# Patient Record
Sex: FEMALE | Race: WHITE | Employment: FULL TIME | URBAN - METROPOLITAN AREA
[De-identification: names, ages, dates, MRNs, and addresses within clinical notes are randomized per-mention and may not be internally consistent; named-entity substitution may affect disease eponyms.]

---

## 2018-01-18 ENCOUNTER — HOSPITAL ENCOUNTER (OUTPATIENT)
Dept: LAB | Age: 28
Discharge: HOME | End: 2018-01-18

## 2018-01-18 ENCOUNTER — CLINICAL SUPPORT (OUTPATIENT)
Dept: FAMILY MEDICINE CLINIC | Age: 28
End: 2018-01-18

## 2018-01-18 DIAGNOSIS — E66.9 OBESITY (BMI 30-39.9): Primary | ICD-10-CM

## 2018-01-18 PROCEDURE — 99001 SPECIMEN HANDLING PT-LAB: CPT | Performed by: NURSE PRACTITIONER

## 2018-01-18 NOTE — PROGRESS NOTES
Patient attended a Medically Supervised Weight Loss New Patient Orientation today where we discussed:  - New Direction Very Low Calorie Diet details  - Medical Supervision  - Nutrition education  - Cost  - Policies and compliance required for program enrollment. Patients initial consultation with physician is tentatively scheduled for:  Future Appointments  Date Time Provider Galo Johnston   2/1/2018 8:15 AM Ruperto Lee NP [address] starting weight was documented as: There were no vitals taken for this visit.     The following patient answers were pulled from Weight Loss Questionnaire regarding weight related illness: (refer to media section for full weight loss history)  Hypertension (high blood pressure)  no   High cholesterol no   Hypothyroidism no   Obstructive sleep apnea no   Gout no   Arthritis no ;    Heart Attack in the last 3 months: no     Type I Diabetes no   Type II Diabetes   no

## 2018-01-19 LAB
1,25(OH)2D3 SERPL-MCNC: 54.7 PG/ML (ref 19.9–79.3)
ALBUMIN SERPL-MCNC: 4.3 G/DL (ref 3.5–5.5)
ALBUMIN/GLOB SERPL: 1.7 {RATIO} (ref 1.2–2.2)
ALP SERPL-CCNC: 73 IU/L (ref 39–117)
ALT SERPL-CCNC: 20 IU/L (ref 0–32)
APPEARANCE UR: CLEAR
AST SERPL-CCNC: 17 IU/L (ref 0–40)
BACTERIA #/AREA URNS HPF: ABNORMAL /[HPF]
BASOPHILS # BLD AUTO: 0 X10E3/UL (ref 0–0.2)
BASOPHILS NFR BLD AUTO: 0 %
BILIRUB SERPL-MCNC: 0.2 MG/DL (ref 0–1.2)
BILIRUB UR QL STRIP: NEGATIVE
BUN SERPL-MCNC: 11 MG/DL (ref 6–20)
BUN/CREAT SERPL: 19 (ref 9–23)
CALCIUM SERPL-MCNC: 9.2 MG/DL (ref 8.7–10.2)
CASTS URNS QL MICRO: ABNORMAL /LPF
CHLORIDE SERPL-SCNC: 103 MMOL/L (ref 96–106)
CHOLEST SERPL-MCNC: 161 MG/DL (ref 100–199)
CO2 SERPL-SCNC: 24 MMOL/L (ref 18–29)
COLOR UR: YELLOW
CREAT SERPL-MCNC: 0.59 MG/DL (ref 0.57–1)
EOSINOPHIL # BLD AUTO: 0.2 X10E3/UL (ref 0–0.4)
EOSINOPHIL NFR BLD AUTO: 2 %
EPI CELLS #/AREA URNS HPF: >10 /HPF
ERYTHROCYTE [DISTWIDTH] IN BLOOD BY AUTOMATED COUNT: 13.7 % (ref 12.3–15.4)
EST. AVERAGE GLUCOSE BLD GHB EST-MCNC: 128 MG/DL
GLOBULIN SER CALC-MCNC: 2.5 G/DL (ref 1.5–4.5)
GLUCOSE SERPL-MCNC: 75 MG/DL (ref 65–99)
GLUCOSE UR QL: NEGATIVE
HBA1C MFR BLD: 6.1 % (ref 4.8–5.6)
HCT VFR BLD AUTO: 39.4 % (ref 34–46.6)
HDLC SERPL-MCNC: 56 MG/DL
HGB BLD-MCNC: 12.7 G/DL (ref 11.1–15.9)
HGB UR QL STRIP: NEGATIVE
IMM GRANULOCYTES # BLD: 0 X10E3/UL (ref 0–0.1)
IMM GRANULOCYTES NFR BLD: 0 %
KETONES UR QL STRIP: NEGATIVE
LDLC SERPL CALC-MCNC: 85 MG/DL (ref 0–99)
LEUKOCYTE ESTERASE UR QL STRIP: NEGATIVE
LYMPHOCYTES # BLD AUTO: 2.7 X10E3/UL (ref 0.7–3.1)
LYMPHOCYTES NFR BLD AUTO: 23 %
MAGNESIUM SERPL-MCNC: 2 MG/DL (ref 1.6–2.3)
MCH RBC QN AUTO: 28.2 PG (ref 26.6–33)
MCHC RBC AUTO-ENTMCNC: 32.2 G/DL (ref 31.5–35.7)
MCV RBC AUTO: 87 FL (ref 79–97)
MICRO URNS: NORMAL
MICRO URNS: NORMAL
MONOCYTES # BLD AUTO: 0.8 X10E3/UL (ref 0.1–0.9)
MONOCYTES NFR BLD AUTO: 7 %
MUCOUS THREADS URNS QL MICRO: PRESENT
NEUTROPHILS # BLD AUTO: 8.1 X10E3/UL (ref 1.4–7)
NEUTROPHILS NFR BLD AUTO: 68 %
NITRITE UR QL STRIP: NEGATIVE
PH UR STRIP: 7 [PH] (ref 5–7.5)
PLATELET # BLD AUTO: 266 X10E3/UL (ref 150–379)
POTASSIUM SERPL-SCNC: 4 MMOL/L (ref 3.5–5.2)
PROT SERPL-MCNC: 6.8 G/DL (ref 6–8.5)
PROT UR QL STRIP: NEGATIVE
RBC # BLD AUTO: 4.51 X10E6/UL (ref 3.77–5.28)
RBC #/AREA URNS HPF: ABNORMAL /HPF
SODIUM SERPL-SCNC: 142 MMOL/L (ref 134–144)
SP GR UR: 1.02 (ref 1–1.03)
TRIGL SERPL-MCNC: 98 MG/DL (ref 0–149)
TSH SERPL DL<=0.005 MIU/L-ACNC: 2.19 UIU/ML (ref 0.45–4.5)
URATE SERPL-MCNC: 4.6 MG/DL (ref 2.5–7.1)
UROBILINOGEN UR STRIP-MCNC: 0.2 MG/DL (ref 0.2–1)
VLDLC SERPL CALC-MCNC: 20 MG/DL (ref 5–40)
WBC # BLD AUTO: 11.9 X10E3/UL (ref 3.4–10.8)
WBC #/AREA URNS HPF: ABNORMAL /HPF

## 2018-02-01 ENCOUNTER — OFFICE VISIT (OUTPATIENT)
Dept: INTERNAL MEDICINE CLINIC | Age: 28
End: 2018-02-01

## 2018-02-01 VITALS
HEIGHT: 67 IN | HEART RATE: 88 BPM | SYSTOLIC BLOOD PRESSURE: 147 MMHG | RESPIRATION RATE: 12 BRPM | OXYGEN SATURATION: 99 % | TEMPERATURE: 99.4 F | DIASTOLIC BLOOD PRESSURE: 99 MMHG | BODY MASS INDEX: 43.13 KG/M2 | WEIGHT: 274.8 LBS

## 2018-02-01 DIAGNOSIS — R03.0 ELEVATED BLOOD PRESSURE READING: ICD-10-CM

## 2018-02-01 DIAGNOSIS — E66.01 MORBID OBESITY WITH BMI OF 40.0-44.9, ADULT (HCC): Primary | ICD-10-CM

## 2018-02-01 DIAGNOSIS — R73.01 IMPAIRED FASTING GLUCOSE: ICD-10-CM

## 2018-02-01 RX ORDER — VALACYCLOVIR HYDROCHLORIDE 500 MG/1
TABLET, FILM COATED ORAL
Refills: 10 | COMMUNITY
Start: 2018-01-09

## 2018-02-01 RX ORDER — MEDROXYPROGESTERONE ACETATE 10 MG/1
TABLET ORAL
Refills: 0 | COMMUNITY
Start: 2017-11-27 | End: 2018-02-01 | Stop reason: ALTCHOICE

## 2018-02-01 RX ORDER — NORGESTREL AND ETHINYL ESTRADIOL 0.3-0.03MG
KIT ORAL
Refills: 11 | COMMUNITY
Start: 2018-01-10

## 2018-02-01 NOTE — MR AVS SNAPSHOT
[facility] 
 
 
 [address] [place], [facility] [address] [place] [address] 
[phone] Patient: Jerzy Aden MRN: [id number] GCB:[birth year] Visit Information Date & Time Provider Department Dept. Phone Encounter #  
 2/1/2018  8:15 AM Selina Chamorro NP [facility] [phone] [id number] Follow-up Instructions Return in about 4 weeks (around 3/1/2018). Your Appointments 3/1/2018  9:27 AM  
METABOLIC PROGRAM 15 with Selina Chamorro NP [facility] ([facility]) Appt Note: [facility]  
 [facility] [address]  
  
   
 [address] [facility] Up[facility] Health Maintenance Date Due DTaP/Tdap/Td series (1 - Tdap) 7/12/2011 PAP AKA CERVICAL CYTOLOGY 7/12/2011 Influenza Age 5 to Adult 8/1/2017 Allergies as of 2/1/2018  Review Complete On: 2/1/2018 By: Selina Chamorro NP No Known Allergies Current Immunizations  Never Reviewed No immunizations on file. Not reviewed this visit You Were Diagnosed With   
  
 Codes Comments Morbid obesity with BMI of 40.0-44.9, adult ([facility] [address]    -  Primary ICD-10-CM: E66.01, Z68.41 
ICD-9-CM: 278.01, V85.41 Class 3 obesity due to excess calories with serious comorbidity and body mass index (BMI) of 40.0 to 44.9 in adult [facility])     ICD-10-CM: E66.09, Z68.41 
ICD-9-CM: 278.00, V85.41 Elevated blood pressure reading     ICD-10-CM: R03.0 ICD-9-CM: 796.2 Impaired fasting glucose     ICD-10-CM: R73.01 
ICD-9-CM: 790.21 Vitals BP Pulse Temp Resp Height(growth percentile) Weight(growth percentile) (!) 147/99 (BP 1 Location: Left arm, BP Patient Position: Sitting) 88 99.4 °F (37.4 °C) (Oral) 12 5' 7\" (1.702 m) 274 lb 12.8 oz (124.6 kg) LMP SpO2 BMI OB Status Smoking Status 01/07/2018 99% 43.04 kg/m2 Having regular periods Never Smoker BMI and BSA Data Body Mass Index Body Surface Area 43.04 kg/m 2 2.43 m 2 Your Updated Medication List  
  
   
This list is accurate as of: 2/1/18  9:19 AM.  Always use your most recent med list.  
  
  
  
  
 LOW-OGESTREL (28) 0.3-30 mg-mcg Tab Generic drug:  norgestrel-ethinyl estradiol TK 1 T PO ONCE D  
  
 valACYclovir 500 mg tablet Commonly known as:  VALTREX TK 1 T PO  ONCE DAILY We Performed the Following AMB POC EKG ROUTINE W/ 12 LEADS, INTER & REP [12984 CPT(R)] Follow-up Instructions Return in about 4 weeks (around 3/1/2018). To-Do List   
 02/01/2018 Lab:  METABOLIC PANEL, COMPREHENSIVE   
  
 02/01/2018 Lab:  URIC ACID Patient Instructions Health Maintenance Due Topic Date Due  
 DTaP/Tdap/Td series (1 - Tdap) 07/12/2011  PAP AKA CERVICAL CYTOLOGY  07/12/2011  Influenza Age 5 to Adult  08/01/2017 Monthly goal: 
 
10-15 lbs weight loss 4 meal replacements a day. No other food. First one within about 1 hour of waking up to get metabolism started. Don't go more than 6 hours between meals. No more than 1 soup a day- too much salt No more than 1 bar a day- not enough protein.  
  
10 carbs extra a day. Compliance 
  
We do not expect perfection. However, we do ask for your persistence and your willingness to do the work of growing yourself.  
  
You will hit plateaus in your weight loss. You will run into situations that test your will power. Rebeka Joyce will encounter times when you feel frustrated. It's OK if you slip up from time to time. However, how your respond to your slip ups and, the adjustments you make to prevent future slip ups will determine you long-term success. 
  
If you find yourself temporarily slipping in the program, it's OK. We will gently nudge you forward and encourage you to do the work of identifying and moving beyond your stuck areas.  However, if you find yourself wavering in the program for a prolonged time (more than 3 or 4 months) we will ask that you take a break from the program. At that time you will 3 options:  
  
1. Work with our weight loss specialist LES English to explore additional weight loss options.  
  
2. Work with a counselor to do some focused work in order to identify and break the patterns that are holding you back.  
  
3. The combination of both these. 
  
Once you, your counselor and/or Ami feel that you have moved past those patterns and want to give the program another chance, we will gladly work with you to determine if you're ready to start back in the program. 
  
When returning after a break, if it's been less than 3 months, you do not need to repeat an orientation, labs or an EKG. If it's over been 3 months you will required to repeat an orientation and, if greater than 6 months, you will be required to repeat an orientation, labs and an EKG.   
  
  
Additional Tips 
  
- Consume your 4 daily meal replacements equally spaced over the day No more than 6 hours between each meal 
Breakfast is especially important 
  
- Get the support of family and friends 
  
- Snack-proof your house 
  
- Have strategies for social situations, meetings that run over or vacation 
  
  
- When you fall off the plan it's no big deal; just start right back. Turn those days into examples of what to change or avoid next time.  
  
  
 
Homework for [employer] 
 
Exercise Exercise daily  
- Start slow, gradually increase your time by around 10 to 20 % a week - To prevent injury, take a recovery week every 4 weeks (reduce your exercise time and intensity by 1/2 during this time) - Your goal is to work up to 150 min a week; hard enough that you can't whistle or sing. It may take 6 months to work up to this. - Call the Health  at [facility] labs for exercise ideas ([phone]) Diet Common Side Effects -Constipation 
-Fatigue 
-Hunger 
-Low sodium 
-Hair Loss 1. Constipation  
     -around 1 out of 5 chance it happens at all 
     -around 1 out of 10 chance you'll need to take something (see below) It can be prevented by Drinking at least 8 glasses of water a day If you're prone to constipation: - Take a Stool Softener every day:  (eg - Dulcolax Stool Softener 100 mg or Miralax) - Eat Plenty of Fiber Get the New Direction products with fiber added Keep your fiber intake to at least 20 grams a day Use SUGAR-FREE products: Fiber One products, Benefiber or Metamucil If you get constipated: 1. Start with a Stool Softener (produces a bowel movement in 72 hr): 
 Examples: 
  Miralax 1 capful twice a day (It's OK to go up to 3 caps for a few days) Dulcolax Stool Softener 100 mg 
 
2. If you still have constipation after 2 or 3 days, add a Stimulant Laxative to the Stool Softener (this will produce a bowel movement in 6 - 12 hr): 
 Examples: 
  Exlax (1 small square) for up to 4 days Dulcolax stimulant laxative Magnesium citrate pill 500 mg start with once a day and go up to 3 times a day if needed 3. Or you can go straight to a combination Stool Softner / Stimulant at night. If you need, you can add a morning dose. Examples: 
  Pericolace 50 mg / 8.25 mg Sennakot-S  50 mg / 8.25 mg 
 
4. If You're Really locked up, get Liquid Magnesium Citrate (take according to the      directions) 2. Fatigue 
     -Everyone goes through this stage More common in the first 2 weeks It's your body adjusting to the Ketogenic diet and it's normal  
 
 
3. Hunger More common in the first few days After your body adjusts to the Ketogenic diet it will go away 4. Low blood levels of sodium 
     -Not very common, especially if you're not taking a fluid pil If you develop a headache, feel fatigued, light-headed or dizzy Add 1/2 cup of bouillon broth every day 5. Hair loss -This is fairly uncommon; you may see more than a usual amount of hair in your brush or the shower drain This can happen with physical stress (surgery, trauma or calorie restriction) or psychological stress. Although is it very concerning, it is often temporary and will resolve within 3 months. Some people notice a benefit from taking a daily dose of Biotin 2,500 mcg and increasing their Fish Oil intake. Other Tips and Helpful Information - Get the following books (all found on [employer]) Change Anything by Kvng Cruz, Tabitha Rodriguez, and Jonathan Villagran Mindless Eating by Keely Hartman Perfectly Yourself by Vinny Hicks Me, Myself and I - 28 Days to Self-Love by Edy Calhoun - Consume your 4 daily meal replacements equally spaced over the    day No more than 6 hours between each meal 
 Breakfast is especially important - Get the support of family and friends 
 
- Snack-proof your house - Have strategies for social situations, meetings that run over or vacation - When you fall off the plan it's no big deal; just start right back; turn those days into examples of what to avoid next time. Long-term Healthy Weight / Body Fat Different ways to determining your ideal weight: 
1. Keep your waist to less than 1/2 of your height 2. Keep your % body fat to under 30% for female and under 20% if male 3. Keep your BMI around 25 Supplements 1. Vitacost Synergy Basic Multi-Vitamin (Their In-House Brand) Take 1 capsule twice a day Found ONLY at [facility], NOT at [facility], [facility], the Vitamin Shoppe, etc 
    SKU #: [id number]  
    $16.49   / 1 month supply 
    www. [employer]  [phone]  
 
 
2. N-Acetyl Cysteine (NAC) -- 600 mg 
     1 pill once a day Found at many stores but [address] has a good price: 
     Item #: [id number] [id number]  $9.99 / 120 pills (4 month supply) 3. Turmeric with Black Pepper Extract (or Bioperine) 500 mg 
    1 pill 1-2 times a day (more is joint pain or increased inflammation) Found at many stores but [address] has a good price: 
    PRAKASH #: [id number]  $13.64 / 60 pills 4. Fish Oil Take 1 capsule a day [employer] Brand: \"Omega 3 Fish Oil (per pill:  )\" OR 
 
Take 1 Tbsp 3 days a week of any of the following: 
 
V[employer] Shoppe Brand: Lemon or Orange flavored Fish Oil Nutra Sea + D:  Apple flavored Nutra Sea:  [place] flavored (These are found at most Vitamin Stores or on [employer]) FYI:  
Typical fish oil per pill =  mg and  mg 
Krill oil per pill = EPA 50 - 70 mg and DHA 27 - 250 mg 
 
 
^^^^^^^^^^^^^^^^^^^^^^^^^^^^^^^^^^^^^^^^^^^^^^^^^^^^^^^^^^^^^^^^^^^^^^^^^^^^^^^^^^^^^^^^^^^^^^^ Carbohydrates If you do not metabolize carbohydrates well, you will lose weight and keep the weigh off by keeping your carbohydrate intake low. How do you know if you do not metabolize carbs well? If your Triglycerides, sdLCL-C and Insulin Resistance are elevated, then you will do well to follow a low carb diet. Fun Facts About Carbs - The Typical American Diet = 450 grams a day - The Reducing Phase of the VLCD = 40 grams a day - Target for weight loss maintenance: 
 - Eat no more than 30 grams per meal 
 - Eat no more than 10 grams per snack (mid-morning and mid-afternoon snack) Resources for finding out where carbs hide in our foods: 
1. Our Registered Dietitians 2. Www. [employer]. com/tools 3. Read food labels 4. Books - The Calorie Maganrashad Tobin - The New Atkins Diet for a New You 
     - Bela Moise's NEW Carb and Calorie 4th Edition (my favorite) 5. Smart phone and Internet-based apps - [employer]  
     - Carb Manager If you want to get a better picture of your cardiac risk, consider getting a coronary calcium score:  Call [phone] to schedule one. Introducing [facility]! [address] introduces Portable Scores patient portal. Now you can access parts of your medical record, email your doctor's office, and request medication refills online. 1. In your internet browser, go to [email or web address] 2. Click on the First Time User? Click Here link in the Sign In box. You will see the New Member Sign Up page. 3. Enter your Portable Scores Access Code exactly as it appears below. You will not need to use this code after youve completed the sign-up process. If you do not sign up before the expiration date, you must request a new code. · Portable Scores Access Code: [email or web address] Expires: 5/2/2018  8:44 AM 
 
4. Enter the last four digits of your Social Security Number (xxxx) and Date of Birth (mm/dd/yyyy) as indicated and click Submit. You will be taken to the next sign-up page. 5. Create a Portable Scores ID. This will be your Portable Scores login ID and cannot be changed, so think of one that is secure and easy to remember. 6. Create a Portable Scores password. You can change your password at any time. 7. Enter your Password Reset Question and Answer. This can be used at a later time if you forget your password. 8. Enter your e-mail address. You will receive e-mail notification when new information is available in [address] 9. Click Sign Up. You can now view and download portions of your medical record. 10. Click the Download Summary menu link to download a portable copy of your medical information. If you have questions, please visit the Frequently Asked Questions section of the Portable Scores website. Remember, Portable Scores is NOT to be used for urgent needs. For medical emergencies, dial 911. Now available from your iPhone and Android! Please provide this summary of care documentation to your next provider. Your primary care clinician is listed as VERN RAMIREZ.  If you have any questions after today's visit, please call [phone].

## 2018-02-01 NOTE — PATIENT INSTRUCTIONS
Health Maintenance Due   Topic Date Due    DTaP/Tdap/Td series (1 - Tdap) 07/12/2011    PAP AKA CERVICAL CYTOLOGY  07/12/2011    Influenza Age 9 to Adult  08/01/2017     Monthly goal:    10-15 lbs weight loss      4 meal replacements a day. No other food. First one within about 1 hour of waking up to get metabolism started. Don't go more than 6 hours between meals. No more than 1 soup a day- too much salt  No more than 1 bar a day- not enough protein.      10 carbs extra a day. Compliance     We do not expect perfection. However, we do ask for your persistence and your willingness to do the work of growing yourself.      You will hit plateaus in your weight loss. You will run into situations that test your will power. Gillian Gambino will encounter times when you feel frustrated. It's OK if you slip up from time to time. However, how your respond to your slip ups and, the adjustments you make to prevent future slip ups will determine you long-term success.     If you find yourself temporarily slipping in the program, it's OK. We will gently nudge you forward and encourage you to do the work of identifying and moving beyond your stuck areas. However, if you find yourself wavering in the program for a prolonged time (more than 3 or 4 months) we will ask that you take a break from the program. At that time you will 3 options:      1. Work with our weight loss specialist LES English to explore additional weight loss options.      2. Work with a counselor to do some focused work in order to identify and break the patterns that are holding you back.      3.  The combination of both these.     Once you, your counselor and/or Ami feel that you have moved past those patterns and want to give the program another chance, we will gladly work with you to determine if you're ready to start back in the program.     When returning after a break, if it's been less than 3 months, you do not need to repeat an orientation, labs or an EKG. If it's over been 3 months you will required to repeat an orientation and, if greater than 6 months, you will be required to repeat an orientation, labs and an EKG.          Additional Tips     - Consume your 4 daily meal replacements equally spaced over the day  No more than 6 hours between each meal  Breakfast is especially important     - Get the support of family and friends     - Snack-proof your house     - Have strategies for social situations, meetings that run over or vacation        - When you fall off the plan it's no big deal; just start right back. Turn those days into examples of what to change or avoid next time.           Homework for [employer]    Exercise    Exercise daily   - Start slow, gradually increase your time by around 10 to 20 % a week    - To prevent injury, take a recovery week every 4 weeks (reduce your exercise time and intensity by 1/2 during this time)    - Your goal is to work up to 150 min a week; hard enough that you can't whistle or sing. It may take 6 months to work up to this. - Call the Health  at [facility] labs for exercise ideas ([phone])          Diet          Common Side Effects    -Constipation  -Fatigue  -Hunger  -Low sodium  -Hair Loss      1. Constipation        -around 1 out of 5 chance it happens at all       -around 1 out of 10 chance you'll need to take something (see below)    It can be prevented by Drinking at least 8 glasses of water a day    If you're prone to constipation:  - Take a Stool Softener every day:  (eg - Dulcolax Stool Softener 100 mg or Miralax)  - Eat Plenty of Fiber   Get the New Direction products with fiber added   Keep your fiber intake to at least 20 grams a day   Use SUGAR-FREE products: Fiber One products, Benefiber or Metamucil      If you get constipated:  1.  Start with a Stool Softener (produces a bowel movement in 72 hr):   Examples:    Miralax 1 capful twice a day (It's OK to go up to 3 caps for a few days)    Dulcolax Stool Softener 100 mg    2. If you still have constipation after 2 or 3 days, add a Stimulant Laxative to the Stool Softener (this will produce a bowel movement in 6 - 12 hr):   Examples:    Exlax (1 small square) for up to 4 days    Dulcolax stimulant laxative    Magnesium citrate pill 500 mg start with once a day and go up to 3 times a day if needed    3. Or you can go straight to a combination Stool Softner / Stimulant at night. If you need, you can add a morning dose. Examples:    Pericolace 50 mg / 8.25 mg    Sennakot-S  50 mg / 8.25 mg    4. If You're Really locked up, get Liquid Magnesium Citrate (take according to the      directions)      2. Fatigue       -Everyone goes through this stage  More common in the first 2 weeks  It's your body adjusting to the Ketogenic diet and it's normal       3. Hunger  More common in the first few days  After your body adjusts to the Ketogenic diet it will go away       4. Low blood levels of sodium       -Not very common, especially if you're not taking a fluid pil  If you develop a headache, feel fatigued, light-headed or dizzy  Add 1/2 cup of bouillon broth every day      5. Hair loss       -This is fairly uncommon; you may see more than a usual amount of hair in your brush or the shower drain  This can happen with physical stress (surgery, trauma or calorie restriction) or psychological stress. Although is it very concerning, it is often temporary and will resolve within 3 months. Some people notice a benefit from taking a daily dose of Biotin 2,500 mcg and increasing their Fish Oil intake.       Other Tips and Helpful Information    - Get the following books (all found on [address])    Change Anything by Waldo Pichardo, Princess Cox, and Geraldyne Nissen    Mindless Eating by Erin Kohli    Perfectly Yourself by Tania Robes    Me, Myself and I - 28 Days to Self-Love by Shanice Scott your 4 daily meal replacements equally spaced over the    day   No more than 6 hours between each meal   Breakfast is especially important    - Get the support of family and friends    - Snack-proof your house    - Have strategies for social situations, meetings that run over or vacation      - When you fall off the plan it's no big deal; just start right back; turn those days into examples of what to avoid next time. Long-term Healthy Weight / Body Fat  Different ways to determining your ideal weight:  1. Keep your waist to less than 1/2 of your height   2. Keep your % body fat to under 30% for female and under 20% if male  3. Keep your BMI around 25        Supplements      1. Vitacost Synergy Basic Multi-Vitamin (Their In-House Brand)      Take 1 capsule twice a day        Found ONLY at [facility], NOT at [facility], [place], [employer], the Vitamin Shoppe, etc      SKU #: [id number]       $16.49   / 1 month supply      www. [employer]  [phone]       2. N-Acetyl Cysteine (NAC) -- 600 mg       1 pill once a day       Found at many stores but [employer] has a good price:       Item #: [id number]  $9.99 / 120 pills (4 month supply)      3. Turmeric with Black Pepper Extract (or Bioperine) 500 mg      1 pill 1-2 times a day (more is joint pain or increased inflammation)      Found at many stores but [employer] has a good price:      SKU #: [id number]  $13.64 / 60 pills        4.  Fish Oil      Take 1 capsule a day      Vitamin Shoppe Brand: \"Omega 3 Fish Oil (per pill:  )\"                                                               OR    Take 1 Tbsp 3 days a week of any of the following:    Vitamin Shoppe Brand: Lemon or Orange flavored Fish Oil   Nutra Sea + D:  Apple flavored   Nutra Sea:  [place] flavored   (These are found at most Vitamin Stores or on [employer])    FYI:   Typical fish oil per pill =  mg and  mg  Krill oil per pill = EPA 50 - 70 mg and DHA 27 - 250 mg      ^^^^^^^^^^^^^^^^^^^^^^^^^^^^^^^^^^^^^^^^^^^^^^^^^^^^^^^^^^^^^^^^^^^^^^^^^^^^^^^^^^^^^^^^^^^^^^^      Carbohydrates     If you do not metabolize carbohydrates well, you will lose weight and keep the weigh off by keeping your carbohydrate intake low. How do you know if you do not metabolize carbs well? If your Triglycerides, sdLCL-C and Insulin Resistance are elevated, then you will do well to follow a low carb diet. Fun Facts About Carbs    - The Typical American Diet = 450 grams a day    - The Reducing Phase of the VLCD = 40 grams a day    - Target for weight loss maintenance:   - Eat no more than 30 grams per meal   - Eat no more than 10 grams per snack (mid-morning and mid-afternoon snack)        Resources for finding out where carbs hide in our foods:  1. Our Registered Dietitians    2. Www. Atkins. com/tools    3. Read food labels    4. Books       - The Calorie Threasa Simmer       - The [place] System Diet for a New You       - Bela Moise's NEW Carb and Calorie 4th Edition (my favorite)    5. Smart phone and Internet-based apps       - POPSUGARFitnessPal        - Carb Manager      If you want to get a better picture of your cardiac risk, consider getting a coronary calcium score:  Call [phone] to schedule one.

## 2018-02-01 NOTE — PROGRESS NOTES
VLCD Progress Note: Initial Physician Visit    Ghulam Saravia is a 32 y.o. female who is here for medical screening for the VLCD Program.      Weight History  Current weight 274.8 lb Body mass index is 43.04 kg/(m^2). Goal weight 150 lb  Highest weight 274.8lb, at 32years old    Weight loss History  How many weight loss attempts have you had? 5  Which program were you most successful at? [facility]    Significant Medical History  MI w/ in the last 3 months: no  Type I Diabetes: no  Type II Diabetes: impaired fasting glucose, A1C on initial testing 6.1  Liver or Kidney disease requiring protein restriction: no  Pregnant or planning on becoming pregnant w/in 6 months: no  Recent treatment for Cancer: no  History of Uric-acid Kidney Stone or elevated Uric Acid: no  Peptic ulcer disease not well controlled: no  Recent onset of Inflammatory Bowel Disease: no    If female:  Patient's last menstrual period was 2018.     Significant Psychosocial History  Major lifestyle changes: no   Other commitments: no   Any potential unsupportive: no     Current psychotherapy: no  Ever hospitalized for psychiatric reasons: no  History of depression: no  History of suicidal ideation: no  Dramatic mood changes during dieting: no  History of binge eating: yes  If yes, is there a history of purging: no    Social History  Social History   Substance Use Topics    Smoking status: Never Smoker    Smokeless tobacco: Never Used    Alcohol use Yes      Comment: social        Has Robert Emerson ever been told by a physician not to exercise: no    Does Robert Emerson know of any reason they shouldn't exercise: no  If yes, why?       Does Robert Emerson have any food allergies or sensitivities: no    Allergies include: No Known Allergies    PHQ over the last two weeks 2018   Little interest or pleasure in doing things Not at all   Feeling down, depressed or hopeless Several days   Total Score PHQ 2 1         Objective    Visit Vitals    BP (!) 147/99 (BP 1 Location: Left arm, BP Patient Position: Sitting)    Pulse 88    Temp 99.4 °F (37.4 °C) (Oral)    Resp 12    Ht 5' 7\" (1.702 m)    Wt 274 lb 12.8 oz (124.6 kg)    LMP 01/07/2018    SpO2 99%    BMI 43.04 kg/m2     Appearance: Obese, A&O x 3, NAD  HEENT:  NC/AT, PERRL  Neck:  No nodes, no JVD  Heart:  RRR without M/R/G  Lungs:  CTAB, no rhonchi, rales, or wheezes with good air exchange   Abdomen:  Non-tender, pos bowel sounds, no hepatosplenomegally  Ext:  No C/C/E      Labs    [facility] labs reviewed extensively with patient. Will continue with monthly CMP, uric acid checks    CBC:   Lab Results   Component Value Date/Time    WBC 11.9 01/18/2018 12:00 AM    RBC 4.51 01/18/2018 12:00 AM    HGB 12.7 01/18/2018 12:00 AM    HCT 39.4 01/18/2018 12:00 AM    PLATELET 187 30/21/2158 12:00 AM     CMP:   Lab Results   Component Value Date/Time    Glucose 75 01/18/2018 12:00 AM    Sodium 142 01/18/2018 12:00 AM    Potassium 4.0 01/18/2018 12:00 AM    Chloride 103 01/18/2018 12:00 AM    CO2 24 01/18/2018 12:00 AM    BUN 11 01/18/2018 12:00 AM    Creatinine 0.59 01/18/2018 12:00 AM    Calcium 9.2 01/18/2018 12:00 AM    BUN/Creatinine ratio 19 01/18/2018 12:00 AM    Alk. phosphatase 73 01/18/2018 12:00 AM    Protein, total 6.8 01/18/2018 12:00 AM    Albumin 4.3 01/18/2018 12:00 AM    A-G Ratio 1.7 01/18/2018 12:00 AM      Lab Results   Component Value Date/Time    Hemoglobin A1c 6.1 01/18/2018 12:00 AM    Glucose 75 01/18/2018 12:00 AM    LDL, calculated 85 01/18/2018 12:00 AM    Creatinine 0.59 01/18/2018 12:00 AM      Lab Results   Component Value Date/Time    Cholesterol, total 161 01/18/2018 12:00 AM    HDL Cholesterol 56 01/18/2018 12:00 AM    LDL, calculated 85 01/18/2018 12:00 AM    Triglyceride 98 01/18/2018 12:00 AM     Lab Results   Component Value Date/Time    TSH 2.190 01/18/2018 12:00 AM          Assessment/Plan    ICD-10-CM ICD-9-CM    1.  Morbid obesity with BMI of 40.0-44.9, adult ([facility]) E66.01 278.01 AMB POC EKG ROUTINE W/ 12 LEADS, INTER & REP    F78.23 C84.15 METABOLIC PANEL, COMPREHENSIVE      URIC ACID   2. Class 3 obesity due to excess calories with serious comorbidity and body mass index (BMI) of 40.0 to 44.9 in adult ([facility]) E66.09 278.00     Z68.41 V85.41    3. Elevated blood pressure reading R03.0 796.2    4. Impaired fasting glucose R73.01 790.21        Diet regimen   # of meal replacements prescribed: 4   If modified LCD-nutritional guidelines:    Monthly Goal   10-15 lbs weight loss    Medical monitoring schedule:   Weekly BP/Weight checks   Monthly provider appointments      I have reviewed/discussed the above normal BMI with the patient. I have recommended the following interventions: dietary management education, guidance, and counseling and prescribed dietary intake . .      Ms. Joey Garcia has a reminder for a \"due or due soon\" health maintenance. I have asked that she contact her primary care provider for follow-up on this health maintenance.

## 2018-02-01 NOTE — PROGRESS NOTES
Chief Complaint   Patient presents with    Weight Management     Consult     1. Have you been to the ER, urgent care clinic since your last visit? Hospitalized since your last visit? No    2. Have you seen or consulted any other health care providers outside of the [address] [place] [address] since your last visit? Include any pap smears or colon screening.  No

## 2018-02-07 ENCOUNTER — CLINICAL SUPPORT (OUTPATIENT)
Dept: FAMILY MEDICINE CLINIC | Age: 28
End: 2018-02-07

## 2018-02-07 DIAGNOSIS — E66.9 OBESITY, UNSPECIFIED CLASSIFICATION, UNSPECIFIED OBESITY TYPE, UNSPECIFIED WHETHER SERIOUS COMORBIDITY PRESENT: Primary | ICD-10-CM

## 2018-02-12 VITALS — WEIGHT: 271.4 LBS | BODY MASS INDEX: 42.51 KG/M2

## 2018-02-12 NOTE — PROGRESS NOTES
Wt Readings from Last 3 Encounters:   02/12/18 271 lb 6.4 oz (123.1 kg)   02/01/18 274 lb 12.8 oz (124.6 kg)     No vitals taken. Patient attended weekly education class facilitated by Registered Dietitian.

## 2018-02-14 ENCOUNTER — CLINICAL SUPPORT (OUTPATIENT)
Dept: FAMILY MEDICINE CLINIC | Age: 28
End: 2018-02-14

## 2018-02-15 VITALS
SYSTOLIC BLOOD PRESSURE: 139 MMHG | DIASTOLIC BLOOD PRESSURE: 70 MMHG | HEIGHT: 67 IN | BODY MASS INDEX: 41.56 KG/M2 | WEIGHT: 264.8 LBS | HEART RATE: 75 BPM

## 2018-02-15 NOTE — PROGRESS NOTES
Progress Note: Weekly Education Class in the [facility] Weight Loss Program   Is there anything that you or the patient needs to let the [facility] Physician know about? no  Over the past week, have you experienced any side-effects? no    Ghulam Saravia is a 32 y.o. female who is enrolled in [facility] Loss Program    Visit Vitals    /70 (BP 1 Location: Right arm, BP Patient Position: Sitting)    Pulse 75    Ht 5' 7\" (1.702 m)    Wt 264 lb 12.8 oz (120.1 kg)    BMI 41.47 kg/m2     Weight Metrics 2/15/2018 2/14/2018 2/7/2018 2/1/2018 2/1/2018   Weight - 264 lb 12.8 oz 271 lb 6.4 oz - 274 lb 12.8 oz   Waist Measure Inches 50 - - 50 -   Exercise Mins/week - - - 0 -   Body Fat % - - - 44.6 -   BMI - 41.47 kg/m2 42.51 kg/m2 - 43.04 kg/m2         Have you received any other medical care this week? no  If yes, where and for what? Have you had any change in your medications since your last visit? no  If yes what? Did you have any problems adhering to the program last week? no  If yes, please explain:       Eating Habits Over Last Week:  Did you take in 64 oz of non-caloric fluids? yes     Did you consume your 4 meal replacements each day?  yes       Physical Activity Over the Past Week:    Aerobic exercise: 90 min  Resistance exercise: 0 workouts / week

## 2018-02-21 ENCOUNTER — CLINICAL SUPPORT (OUTPATIENT)
Dept: FAMILY MEDICINE CLINIC | Age: 28
End: 2018-02-21

## 2018-02-22 ENCOUNTER — HOSPITAL ENCOUNTER (OUTPATIENT)
Dept: LAB | Age: 28
Discharge: HOME | End: 2018-02-22

## 2018-02-22 VITALS
HEIGHT: 67 IN | BODY MASS INDEX: 41 KG/M2 | SYSTOLIC BLOOD PRESSURE: 128 MMHG | WEIGHT: 261.2 LBS | HEART RATE: 67 BPM | DIASTOLIC BLOOD PRESSURE: 91 MMHG

## 2018-02-22 PROCEDURE — 99001 SPECIMEN HANDLING PT-LAB: CPT | Performed by: NURSE PRACTITIONER

## 2018-02-22 NOTE — PROGRESS NOTES
Progress Note: Weekly Education Class in the [facility] Weight Loss Program   Is there anything that you or the patient needs to let the [facility] Physician know about? no  Over the past week, have you experienced any side-effects? no    Arik Hogue is a 32 y.o. female who is enrolled in [facility] Weight Loss Program    Visit Vitals    BP (!) 128/91 (BP 1 Location: Right arm, BP Patient Position: Sitting)    Pulse 67    Ht 5' 7\" (1.702 m)    Wt 261 lb 3.2 oz (118.5 kg)    BMI 40.91 kg/m2     Weight Metrics 2/22/2018 2/21/2018 2/15/2018 2/14/2018 2/7/2018 2/1/2018 2/1/2018   Weight - 261 lb 3.2 oz - 264 lb 12.8 oz 271 lb 6.4 oz - 274 lb 12.8 oz   Waist Measure Inches 48 - 50 - - 50 -   Exercise Mins/week - - - - - 0 -   Body Fat % - - - - - 44.6 -   BMI - 40.91 kg/m2 - 41.47 kg/m2 42.51 kg/m2 - 43.04 kg/m2         Have you received any other medical care this week? no  If yes, where and for what? Have you had any change in your medications since your last visit? no  If yes what? Did you have any problems adhering to the program last week? no  If yes, please explain:       Eating Habits Over Last Week:  Did you take in 64 oz of non-caloric fluids? yes     Did you consume your 4 meal replacements each day?  yes       Physical Activity Over the Past Week:    Aerobic exercise: 60 min  Resistance exercise: 0 workouts / week

## 2018-02-23 LAB
ALBUMIN SERPL-MCNC: 4.1 G/DL (ref 3.5–5.5)
ALBUMIN/GLOB SERPL: 1.6 {RATIO} (ref 1.2–2.2)
ALP SERPL-CCNC: 73 IU/L (ref 39–117)
ALT SERPL-CCNC: 118 IU/L (ref 0–32)
AST SERPL-CCNC: 52 IU/L (ref 0–40)
BILIRUB SERPL-MCNC: 0.3 MG/DL (ref 0–1.2)
BUN SERPL-MCNC: 10 MG/DL (ref 6–20)
BUN/CREAT SERPL: 14 (ref 9–23)
CALCIUM SERPL-MCNC: 9.6 MG/DL (ref 8.7–10.2)
CHLORIDE SERPL-SCNC: 103 MMOL/L (ref 96–106)
CO2 SERPL-SCNC: 23 MMOL/L (ref 18–29)
CREAT SERPL-MCNC: 0.72 MG/DL (ref 0.57–1)
GFR SERPLBLD CREATININE-BSD FMLA CKD-EPI: 115 ML/MIN/{1.73_M2}
GFR SERPLBLD CREATININE-BSD FMLA CKD-EPI: 133 ML/MIN/{1.73_M2}
GLOBULIN SER CALC-MCNC: 2.5 G/L (ref 1.5–4.5)
GLUCOSE SERPL-MCNC: 77 MG/DL (ref 65–99)
POTASSIUM SERPL-SCNC: 5.3 MMOL/L (ref 3.5–5.2)
PROT SERPL-MCNC: 6.6 G/DL (ref 6–8.5)
SODIUM SERPL-SCNC: 143 MMOL/L (ref 134–144)
URATE SERPL-MCNC: 5.2 MG/DL (ref 2.5–7.1)

## 2018-03-01 ENCOUNTER — OFFICE VISIT (OUTPATIENT)
Dept: INTERNAL MEDICINE CLINIC | Age: 28
End: 2018-03-01

## 2018-03-01 VITALS
WEIGHT: 257.6 LBS | DIASTOLIC BLOOD PRESSURE: 74 MMHG | BODY MASS INDEX: 40.43 KG/M2 | OXYGEN SATURATION: 97 % | HEIGHT: 67 IN | TEMPERATURE: 98.1 F | HEART RATE: 69 BPM | SYSTOLIC BLOOD PRESSURE: 131 MMHG | RESPIRATION RATE: 14 BRPM

## 2018-03-01 DIAGNOSIS — R73.01 IMPAIRED FASTING GLUCOSE: ICD-10-CM

## 2018-03-01 DIAGNOSIS — E66.01 OBESITY, MORBID, BMI 40.0-49.9 (HCC): Primary | ICD-10-CM

## 2018-03-01 NOTE — PATIENT INSTRUCTIONS
Health Maintenance Due   Topic Date Due    DTaP/Tdap/Td series (1 - Tdap) 07/12/2011    PAP AKA CERVICAL CYTOLOGY  07/12/2011    Influenza Age 5 to Adult  08/01/2017     Monthly goal:    10-15 lbs weight loss         Body Mass Index: Care Instructions  Your Care Instructions    Body mass index (BMI) can help you see if your weight is raising your risk for health problems. It uses a formula to compare how much you weigh with how tall you are. · A BMI lower than 18.5 is considered underweight. · A BMI between 18.5 and 24.9 is considered healthy. · A BMI between 25 and 29.9 is considered overweight. A BMI of 30 or higher is considered obese. If your BMI is in the normal range, it means that you have a lower risk for weight-related health problems. If your BMI is in the overweight or obese range, you may be at increased risk for weight-related health problems, such as high blood pressure, heart disease, stroke, arthritis or joint pain, and diabetes. If your BMI is in the underweight range, you may be at increased risk for health problems such as fatigue, lower protection (immunity) against illness, muscle loss, bone loss, hair loss, and hormone problems. BMI is just one measure of your risk for weight-related health problems. You may be at higher risk for health problems if you are not active, you eat an unhealthy diet, or you drink too much alcohol or use tobacco products. Follow-up care is a key part of your treatment and safety. Be sure to make and go to all appointments, and call your doctor if you are having problems. It's also a good idea to know your test results and keep a list of the medicines you take. How can you care for yourself at home? · Practice healthy eating habits. This includes eating plenty of fruits, vegetables, whole grains, lean protein, and low-fat dairy. · If your doctor recommends it, get more exercise. Walking is a good choice. Bit by bit, increase the amount you walk every day. Try for at least 30 minutes on most days of the week. · Do not smoke. Smoking can increase your risk for health problems. If you need help quitting, talk to your doctor about stop-smoking programs and medicines. These can increase your chances of quitting for good. · Limit alcohol to 2 drinks a day for men and 1 drink a day for women. Too much alcohol can cause health problems. If you have a BMI higher than 25  · Your doctor may do other tests to check your risk for weight-related health problems. This may include measuring the distance around your waist. A waist measurement of more than 40 inches in men or 35 inches in women can increase the risk of weight-related health problems. · Talk with your doctor about steps you can take to stay healthy or improve your health. You may need to make lifestyle changes to lose weight and stay healthy, such as changing your diet and getting regular exercise. If you have a BMI lower than 18.5  · Your doctor may do other tests to check your risk for health problems. · Talk with your doctor about steps you can take to stay healthy or improve your health. You may need to make lifestyle changes to gain or maintain weight and stay healthy, such as getting more healthy foods in your diet and doing exercises to build muscle. Where can you learn more? Go to http://aby-laura.info/. Enter S176 in the search box to learn more about \"Body Mass Index: Care Instructions. \"  Current as of: October 13, 2016  Content Version: 11.4  © 4223-6621 Healthwise, Incorporated. Care instructions adapted under license by [employer] (which disclaims liability or warranty for this information). If you have questions about a medical condition or this instruction, always ask your healthcare professional. [address] any warranty or liability for your use of this information.

## 2018-03-01 NOTE — MR AVS SNAPSHOT
Antonette Boothe 
 
 
 [address] [facility], [address] [facility] 
[phone] Patient: Camelia Persaud MRN: [id number] HEM:[id number] Visit Information Date & Time Provider Department Dept. Phone Encounter #  
 3/1/2018  8:15 AM Mark Beckford NP [facility] [phone] [id number] Follow-up Instructions Return in about 4 weeks (around 3/29/2018). Your Appointments 4/4/2018  9:15 AM  
METABOLIC PROGRAM 15 with Mark Beckford NP [facility] ([address]) Appt Note: 1 month follow up  
 [address] [facility], [address] [place] [address] [place]  
  
   
 [address], [address] Upcoming Health Maintenance Date Due DTaP/Tdap/Td series (1 - Tdap) 7/12/2011 PAP AKA CERVICAL CYTOLOGY 7/12/2011 Influenza Age 5 to Adult 8/1/2017 Allergies as of 3/1/2018  Review Complete On: 3/1/2018 By: Mark Beckford NP No Known Allergies Current Immunizations  Never Reviewed No immunizations on file. Not reviewed this visit You Were Diagnosed With   
  
 Codes Comments Obesity, morbid, BMI 40.0-49.9 [facility]    -  Primary ICD-10-CM: E66.01 
ICD-9-CM: 278.01 Impaired fasting glucose     ICD-10-CM: R73.01 
ICD-9-CM: 790.21 Vitals BP Pulse Temp Resp Height(growth percentile) Weight(growth percentile) 131/74 (BP 1 Location: Left arm, BP Patient Position: Sitting) 69 98.1 °F (36.7 °C) (Oral) 14 5' 7\" (1.702 m) 257 lb 9.6 oz (116.8 kg) LMP SpO2 BMI OB Status Smoking Status 02/12/2018 97% 40.35 kg/m2 Having regular periods Never Smoker BMI and BSA Data Body Mass Index Body Surface Area  
 40.35 kg/m 2 2.35 m 2 Your Updated Medication List  
  
   
This list is accurate as of 3/1/18  8:23 AM.  Always use your most recent med list.  
  
  
  
  
 LOW-OGESTREL (28) 0.3-30 mg-mcg Tab Generic drug:  norgestrel-ethinyl estradiol TK 1 T PO ONCE D  
  
 valACYclovir 500 mg tablet Commonly known as:  VALTREX TK 1 T PO  ONCE DAILY Follow-up Instructions Return in about 4 weeks (around 3/29/2018). To-Do List   
 03/01/2018 Lab:  METABOLIC PANEL, COMPREHENSIVE   
  
 03/01/2018 Lab:  URIC ACID Patient Instructions Health Maintenance Due Topic Date Due  
 DTaP/Tdap/Td series (1 - Tdap) 07/12/2011  PAP AKA CERVICAL CYTOLOGY  07/12/2011  Influenza Age 5 to Adult  08/01/2017 Monthly goal: 
 
10-15 lbs weight loss Body Mass Index: Care Instructions Your Care Instructions Body mass index (BMI) can help you see if your weight is raising your risk for health problems. It uses a formula to compare how much you weigh with how tall you are. · A BMI lower than 18.5 is considered underweight. · A BMI between 18.5 and 24.9 is considered healthy. · A BMI between 25 and 29.9 is considered overweight. A BMI of 30 or higher is considered obese. If your BMI is in the normal range, it means that you have a lower risk for weight-related health problems. If your BMI is in the overweight or obese range, you may be at increased risk for weight-related health problems, such as high blood pressure, heart disease, stroke, arthritis or joint pain, and diabetes. If your BMI is in the underweight range, you may be at increased risk for health problems such as fatigue, lower protection (immunity) against illness, muscle loss, bone loss, hair loss, and hormone problems. BMI is just one measure of your risk for weight-related health problems. You may be at higher risk for health problems if you are not active, you eat an unhealthy diet, or you drink too much alcohol or use tobacco products. Follow-up care is a key part of your treatment and safety.  Be sure to make and go to all appointments, and call your doctor if you are having problems. It's also a good idea to know your test results and keep a list of the medicines you take. How can you care for yourself at home? · Practice healthy eating habits. This includes eating plenty of fruits, vegetables, whole grains, lean protein, and low-fat dairy. · If your doctor recommends it, get more exercise. Walking is a good choice. Bit by bit, increase the amount you walk every day. Try for at least 30 minutes on most days of the week. · Do not smoke. Smoking can increase your risk for health problems. If you need help quitting, talk to your doctor about stop-smoking programs and medicines. These can increase your chances of quitting for good. · Limit alcohol to 2 drinks a day for men and 1 drink a day for women. Too much alcohol can cause health problems. If you have a BMI higher than 25 · Your doctor may do other tests to check your risk for weight-related health problems. This may include measuring the distance around your waist. A waist measurement of more than 40 inches in men or 35 inches in women can increase the risk of weight-related health problems. · Talk with your doctor about steps you can take to stay healthy or improve your health. You may need to make lifestyle changes to lose weight and stay healthy, such as changing your diet and getting regular exercise. If you have a BMI lower than 18.5 · Your doctor may do other tests to check your risk for health problems. · Talk with your doctor about steps you can take to stay healthy or improve your health. You may need to make lifestyle changes to gain or maintain weight and stay healthy, such as getting more healthy foods in your diet and doing exercises to build muscle. Where can you learn more? Go to http://aby-laura.info/. Enter S176 in the search box to learn more about \"Body Mass Index: Care Instructions. \" Current as of: October 13, 2016 Content Version: 11.4 © 0246-2856 Healthwise, Incorporated. Care instructions adapted under license by [employer] (which disclaims liability or warranty for this information). If you have questions about a medical condition or this instruction, always ask your healthcare professional. [address] any warranty or liability for your use of this information. Introducing [facility]! [employer] introduces [employer] patient portal. Now you can access parts of your medical record, email your doctor's office, and request medication refills online. 1. In your internet browser, go to [email or web address] 2. Click on the First Time User? Click Here link in the Sign In box. You will see the New Member Sign Up page. 3. Enter your [employer]K Access Code exactly as it appears below. You will not need to use this code after youve completed the sign-up process. If you do not sign up before the expiration date, you must request a new code. · [employer] Access Code: [email or web address] Expires: 5/2/2018  8:44 AM 
 
4. Enter the last four digits of your Social Security Number (xxxx) and Date of Birth (mm/dd/yyyy) as indicated and click Submit. You will be taken to the next sign-up page. 5. Create a [employer]PEK ID. This will be your [employer]K login ID and cannot be changed, so think of one that is secure and easy to remember. 6. Create a [employer]K password. You can change your password at any time. 7. Enter your Password Reset Question and Answer. This can be used at a later time if you forget your password. 8. Enter your e-mail address. You will receive e-mail notification when new information is available in [address] 9. Click Sign Up. You can now view and download portions of your medical record. 10. Click the Download Summary menu link to download a portable copy of your medical information.  
 
If you have questions, please visit the Frequently Asked Questions section of the [employer] Remember, [employer] is NOT to be used for urgent needs. For medical emergencies, dial 911. Now available from your iPhone and Android! Please provide this summary of care documentation to your next provider. Your primary care clinician is listed as VERN RAMIREZ. If you have any questions after today's visit, please call [phone].

## 2018-03-01 NOTE — PROGRESS NOTES
Chief Complaint   Patient presents with    Weight Management     1. Have you been to the ER, urgent care clinic since your last visit? Hospitalized since your last visit? No    2. Have you seen or consulted any other health care providers outside of the [address] [place] [address] since your last visit? Include any pap smears or colon screening.  No

## 2018-03-01 NOTE — PROGRESS NOTES
[facility] Progress Note:   F/up Physician Visit    CC: Obesity      Ana Ny is a 32 y.o. female who is here for her  f/up physician visit for the VLCD Program. Azra Whitten has completed 4 weeks of the program to date    17 lbs weight loss since last visit. Weight History  starting weight 274.8 lb Body mass index is 43.04 kg/(m^2). Current weight 257 lbs  Goal weight 150 lb  Highest weight 274.8lb, at 32years old    Weight Metrics 3/1/2018 3/1/2018 2/22/2018 2/21/2018 2/15/2018 2/14/2018 2/7/2018   Weight - 257 lb 9.6 oz - 261 lb 3.2 oz - 264 lb 12.8 oz 271 lb 6.4 oz   Waist Measure Inches 48 - 48 - 50 - -   Exercise Mins/week 30 - - - - - -   Body Fat % 41.8 - - - - - -   BMI - 40.35 kg/m2 - 40.91 kg/m2 - 41.47 kg/m2 42.51 kg/m2         Current Outpatient Prescriptions   Medication Sig Dispense Refill    LOW-OGESTREL, 28, 0.3-30 mg-mcg tab TK 1 T PO ONCE D  11    valACYclovir (VALTREX) 500 mg tablet TK 1 T PO  ONCE DAILY  10         Participation   Did you attend clinic and class last week? yes    Review of Systems  Since your last visit, have you experienced any complications? no  If yes, please list:     No CP, SOB, palpitations, lightheadedness, dizziness, constipation. Positives highlight in BOLD. Are you taking an appetite suppressant? no  If so, is there any Chest Pain, Palpitations or Dizziness? BP Readings from Last 10 Encounters:   03/01/18 131/74   02/22/18 (!) 128/91   02/15/18 139/70   02/01/18 (!) 147/99         Have you received any other medical care this week? no  If yes, where and for what? Have you discontinued or changed any medicine or dose of your medicine since your last visit? no  If yes, where and for what? Diet  How many ounces of calorie-free liquids did you consume each day?  100 oz    How many meal replacements did you take each day?  4    Did you have any problems adhering to the program?  no If yes, please explain: Exercise  Aerobic exercise: 30 min  Resistance exercise: 0 workouts / week  Any discomfort?  no     If yes, where? Review of Systems  Complete ROS negative except where noted above    Objective  Visit Vitals    /74 (BP 1 Location: Left arm, BP Patient Position: Sitting)    Pulse 69    Temp 98.1 °F (36.7 °C) (Oral)    Resp 14    Ht 5' 7\" (1.702 m)    Wt 257 lb 9.6 oz (116.8 kg)    LMP 02/12/2018    SpO2 97%    BMI 40.35 kg/m2     Patient's last menstrual period was 02/12/2018. PHQ over the last two weeks 2/1/2018   Little interest or pleasure in doing things Not at all   Feeling down, depressed or hopeless Several days   Total Score PHQ 2 1         Waist Circumference: I personally reviewed patient's Weight Management Doc Flowsheet  Neck Circumference: I personally reviewed patient's Weight Management Doc Flowsheet  Percent Body Fat: I personally reviewed patient's Weight Management Doc Flowsheet    Physical Exam  Appearance: well appearing, obese, A&O, NAD  HEENT:  NC/AT, PERRL, No scleral icterus  Heart:  RRR without M/R/G  Lungs:  CTAB, no rhonchi, rales, or wheezes with good air exchange   Ext:  No LE Edema    Assessment / Plan    Encounter Diagnoses   Name Primary?  Obesity, morbid, BMI 40.0-49.9 ([facility]) Yes    Impaired fasting glucose        1. Weight management well controlled   Progress was reviewed with patient    2. Labs    Latest results reviewed with patient   Lab slip given to pt for f/up HDL labs    3. Diet regimen   # of meal replacements prescribed: 4   If modified LCD-nutritional guidelines:    Monthly Goal   As below    Medical monitoring schedule:   Weekly BP/Weight checks   Monthly provider appointments  I have reviewed/discussed the above normal BMI with the patient. I have recommended the following interventions: dietary management education, guidance, and counseling, monitor weight and prescribed dietary intake . .      Ms. Karthikeyan Dubose has a reminder for a \"due or due soon\" health maintenance. I have asked that she contact her primary care provider for follow-up on this health maintenance. Patient Instructions     Health Maintenance Due   Topic Date Due    DTaP/Tdap/Td series (1 - Tdap) 07/12/2011    PAP AKA CERVICAL CYTOLOGY  07/12/2011    Influenza Age 5 to Adult  08/01/2017     Monthly goal:    10-15 lbs weight loss         Body Mass Index: Care Instructions  Your Care Instructions    Body mass index (BMI) can help you see if your weight is raising your risk for health problems. It uses a formula to compare how much you weigh with how tall you are. · A BMI lower than 18.5 is considered underweight. · A BMI between 18.5 and 24.9 is considered healthy. · A BMI between 25 and 29.9 is considered overweight. A BMI of 30 or higher is considered obese. If your BMI is in the normal range, it means that you have a lower risk for weight-related health problems. If your BMI is in the overweight or obese range, you may be at increased risk for weight-related health problems, such as high blood pressure, heart disease, stroke, arthritis or joint pain, and diabetes. If your BMI is in the underweight range, you may be at increased risk for health problems such as fatigue, lower protection (immunity) against illness, muscle loss, bone loss, hair loss, and hormone problems. BMI is just one measure of your risk for weight-related health problems. You may be at higher risk for health problems if you are not active, you eat an unhealthy diet, or you drink too much alcohol or use tobacco products. Follow-up care is a key part of your treatment and safety. Be sure to make and go to all appointments, and call your doctor if you are having problems. It's also a good idea to know your test results and keep a list of the medicines you take. How can you care for yourself at home? · Practice healthy eating habits.  This includes eating plenty of fruits, vegetables, whole grains, lean protein, and low-fat dairy. · If your doctor recommends it, get more exercise. Walking is a good choice. Bit by bit, increase the amount you walk every day. Try for at least 30 minutes on most days of the week. · Do not smoke. Smoking can increase your risk for health problems. If you need help quitting, talk to your doctor about stop-smoking programs and medicines. These can increase your chances of quitting for good. · Limit alcohol to 2 drinks a day for men and 1 drink a day for women. Too much alcohol can cause health problems. If you have a BMI higher than 25  · Your doctor may do other tests to check your risk for weight-related health problems. This may include measuring the distance around your waist. A waist measurement of more than 40 inches in men or 35 inches in women can increase the risk of weight-related health problems. · Talk with your doctor about steps you can take to stay healthy or improve your health. You may need to make lifestyle changes to lose weight and stay healthy, such as changing your diet and getting regular exercise. If you have a BMI lower than 18.5  · Your doctor may do other tests to check your risk for health problems. · Talk with your doctor about steps you can take to stay healthy or improve your health. You may need to make lifestyle changes to gain or maintain weight and stay healthy, such as getting more healthy foods in your diet and doing exercises to build muscle. Where can you learn more? Go to http://aby-laura.info/. Enter S176 in the search box to learn more about \"Body Mass Index: Care Instructions. \"  Current as of: October 13, 2016  Content Version: 11.4  © 4634-7289 [employer]. Care instructions adapted under license by [employer] (which disclaims liability or warranty for this information).  If you have questions about a medical condition or this instruction, always ask your healthcare professional. [employer] disclaims any warranty or liability for your use of this information. Follow-up Disposition:  Return in about 4 weeks (around 3/29/2018). 10 minutes of the 15 minutes face to face time with Amy Hare consisted of counseling & coordinating and/or discussing treatment plans in reference to her The primary encounter diagnosis was Obesity, morbid, BMI 40.0-49.9 ([address] A diagnosis of Impaired fasting glucose was also pertinent to this visit. The patient is to follow up as scheduled and will report to the ED or the office if symptoms change or increase. The patient has voiced understanding and will comply.

## 2018-03-07 ENCOUNTER — CLINICAL SUPPORT (OUTPATIENT)
Dept: FAMILY MEDICINE CLINIC | Age: 28
End: 2018-03-07

## 2018-03-07 DIAGNOSIS — E66.01 OBESITY, MORBID, BMI 40.0-49.9 (HCC): Primary | ICD-10-CM

## 2018-03-08 VITALS
BODY MASS INDEX: 39.43 KG/M2 | WEIGHT: 251.2 LBS | SYSTOLIC BLOOD PRESSURE: 121 MMHG | DIASTOLIC BLOOD PRESSURE: 87 MMHG | HEIGHT: 67 IN | HEART RATE: 73 BPM

## 2018-03-08 NOTE — PROGRESS NOTES
Progress Note: Weekly Education Class in the [facility]   Is there anything that you or the patient needs to let the [facility] Physician know about? no  Over the past week, have you experienced any side-effects? no    Mia Patrick is a 32 y.o. female who is enrolled in [facility]    Visit Vitals    /87 (BP 1 Location: Right arm, BP Patient Position: Sitting)    Pulse 73    Ht 5' 7\" (1.702 m)    Wt 251 lb 3.2 oz (113.9 kg)    LMP 02/12/2018    BMI 39.34 kg/m2     Weight Metrics 3/8/2018 3/7/2018 3/1/2018 3/1/2018 2/22/2018 2/21/2018 2/15/2018   Weight - 251 lb 3.2 oz - 257 lb 9.6 oz - 261 lb 3.2 oz -   Waist Measure Inches 48 - 48 - 48 - 50   Exercise Mins/week - - 30 - - - -   Body Fat % - - 41.8 - - - -   BMI - 39.34 kg/m2 - 40.35 kg/m2 - 40.91 kg/m2 -         Have you received any other medical care this week? no  If yes, where and for what? Have you had any change in your medications since your last visit? no  If yes what? Did you have any problems adhering to the program last week? no  If yes, please explain:       Eating Habits Over Last Week:  Did you take in 64 oz of non-caloric fluids? yes     Did you consume your 4 meal replacements each day?  yes       Physical Activity Over the Past Week:    Aerobic exercise: 0 min  Resistance exercise: 0 workouts / week

## 2018-03-14 ENCOUNTER — CLINICAL SUPPORT (OUTPATIENT)
Dept: FAMILY MEDICINE CLINIC | Age: 28
End: 2018-03-14

## 2018-03-14 DIAGNOSIS — E66.01 OBESITY, MORBID, BMI 40.0-49.9 (HCC): Primary | ICD-10-CM

## 2018-03-15 VITALS
WEIGHT: 248.8 LBS | SYSTOLIC BLOOD PRESSURE: 124 MMHG | HEART RATE: 63 BPM | BODY MASS INDEX: 39.05 KG/M2 | DIASTOLIC BLOOD PRESSURE: 81 MMHG | HEIGHT: 67 IN

## 2018-03-15 NOTE — PROGRESS NOTES
Progress Note: Weekly Education Class in the [facility] Program   Is there anything that you or the patient needs to let the [facility] Physician know about? no  Over the past week, have you experienced any side-effects? no    Xander Lea is a 32 y.o. female who is enrolled in [facility]    Visit Vitals    /81 (BP 1 Location: Right arm, BP Patient Position: Sitting)    Pulse 63    Ht 5' 7\" (1.702 m)    Wt 248 lb 12.8 oz (112.9 kg)    LMP 02/12/2018    BMI 38.97 kg/m2     Weight Metrics 3/15/2018 3/14/2018 3/8/2018 3/7/2018 3/1/2018 3/1/2018 2/22/2018   Weight - 248 lb 12.8 oz - 251 lb 3.2 oz - 257 lb 9.6 oz -   Waist Measure Inches 49 - 48 - 48 - 48   Exercise Mins/week - - - - 30 - -   Body Fat % - - - - 41.8 - -   BMI - 38.97 kg/m2 - 39.34 kg/m2 - 40.35 kg/m2 -         Have you received any other medical care this week? no  If yes, where and for what? Have you had any change in your medications since your last visit? no  If yes what? Did you have any problems adhering to the program last week? no  If yes, please explain:       Eating Habits Over Last Week:  Did you take in 64 oz of non-caloric fluids? yes     Did you consume your 4 meal replacements each day?  yes       Physical Activity Over the Past Week:    Aerobic exercise: 0 min  Resistance exercise: 0 workouts / week

## 2018-03-21 ENCOUNTER — CLINICAL SUPPORT (OUTPATIENT)
Dept: FAMILY MEDICINE CLINIC | Age: 28
End: 2018-03-21

## 2018-03-21 DIAGNOSIS — E66.01 OBESITY, MORBID, BMI 40.0-49.9 (HCC): Primary | ICD-10-CM

## 2018-03-22 VITALS
BODY MASS INDEX: 38.27 KG/M2 | SYSTOLIC BLOOD PRESSURE: 133 MMHG | HEART RATE: 72 BPM | HEIGHT: 67 IN | WEIGHT: 243.8 LBS | DIASTOLIC BLOOD PRESSURE: 85 MMHG

## 2018-03-22 NOTE — PROGRESS NOTES
Progress Note: Weekly Education Class in the [facility] Program   Is there anything that you or the patient needs to let the [facility] Physician know about? no  Over the past week, have you experienced any side-effects? no    Travon Herrera is a 32 y.o. female who is enrolled in [facility]    Visit Vitals    /85 (BP 1 Location: Right arm, BP Patient Position: Sitting)    Pulse 72    Ht 5' 7\" (1.702 m)    Wt 243 lb 12.8 oz (110.6 kg)    BMI 38.18 kg/m2     Weight Metrics 3/22/2018 3/21/2018 3/15/2018 3/14/2018 3/8/2018 3/7/2018 3/1/2018   Weight - 243 lb 12.8 oz - 248 lb 12.8 oz - 251 lb 3.2 oz -   Waist Measure Inches 46 - 49 - 48 - 48   Exercise Mins/week - - - - - - 30   Body Fat % - - - - - - 41.8   BMI - 38.18 kg/m2 - 38.97 kg/m2 - 39.34 kg/m2 -         Have you received any other medical care this week? no  If yes, where and for what? Have you had any change in your medications since your last visit? no  If yes what? Did you have any problems adhering to the program last week? no  If yes, please explain:       Eating Habits Over Last Week:  Did you take in 64 oz of non-caloric fluids? yes     Did you consume your 4 meal replacements each day?  yes       Physical Activity Over the Past Week:    Aerobic exercise: 0 min  Resistance exercise: 0 workouts / week

## 2018-03-28 ENCOUNTER — HOSPITAL ENCOUNTER (OUTPATIENT)
Dept: LAB | Age: 28
Discharge: HOME | End: 2018-03-28

## 2018-03-28 ENCOUNTER — CLINICAL SUPPORT (OUTPATIENT)
Dept: FAMILY MEDICINE CLINIC | Age: 28
End: 2018-03-28

## 2018-03-28 DIAGNOSIS — E66.01 OBESITY, MORBID, BMI 40.0-49.9 (HCC): Primary | ICD-10-CM

## 2018-03-28 PROCEDURE — 99001 SPECIMEN HANDLING PT-LAB: CPT | Performed by: NURSE PRACTITIONER

## 2018-03-29 VITALS
SYSTOLIC BLOOD PRESSURE: 125 MMHG | HEIGHT: 67 IN | DIASTOLIC BLOOD PRESSURE: 80 MMHG | WEIGHT: 240.4 LBS | BODY MASS INDEX: 37.73 KG/M2 | HEART RATE: 71 BPM

## 2018-03-29 LAB
ALBUMIN SERPL-MCNC: 4.3 G/DL (ref 3.5–5.5)
ALBUMIN/GLOB SERPL: 1.9 {RATIO} (ref 1.2–2.2)
ALP SERPL-CCNC: 71 IU/L (ref 39–117)
ALT SERPL-CCNC: 165 IU/L (ref 0–32)
AST SERPL-CCNC: 52 IU/L (ref 0–40)
BILIRUB SERPL-MCNC: 0.3 MG/DL (ref 0–1.2)
BUN SERPL-MCNC: 13 MG/DL (ref 6–20)
BUN/CREAT SERPL: 17 (ref 9–23)
CALCIUM SERPL-MCNC: 9.6 MG/DL (ref 8.7–10.2)
CHLORIDE SERPL-SCNC: 103 MMOL/L (ref 96–106)
CO2 SERPL-SCNC: 22 MMOL/L (ref 18–29)
CREAT SERPL-MCNC: 0.78 MG/DL (ref 0.57–1)
GFR SERPLBLD CREATININE-BSD FMLA CKD-EPI: 105 ML/MIN/1.73
GFR SERPLBLD CREATININE-BSD FMLA CKD-EPI: 120 ML/MIN/1.73
GLOBULIN SER CALC-MCNC: 2.3 G/DL (ref 1.5–4.5)
GLUCOSE SERPL-MCNC: 83 MG/DL (ref 65–99)
POTASSIUM SERPL-SCNC: 4.8 MMOL/L (ref 3.5–5.2)
PROT SERPL-MCNC: 6.6 G/DL (ref 6–8.5)
SODIUM SERPL-SCNC: 142 MMOL/L (ref 134–144)
URATE SERPL-MCNC: 5.7 MG/DL (ref 2.5–7.1)

## 2018-03-29 NOTE — PROGRESS NOTES
Progress Note: Weekly Education Class in the [facility]   Is there anything that you or the patient needs to let the [facility] Physician know about? no  Over the past week, have you experienced any side-effects? no    Dash Dias is a 32 y.o. female who is enrolled in [facility]    Visit Vitals    /80 (BP 1 Location: Right arm, BP Patient Position: Sitting)    Pulse 71    Ht 5' 7\" (1.702 m)    Wt 240 lb 6.4 oz (109 kg)    BMI 37.65 kg/m2     Weight Metrics 3/29/2018 3/28/2018 3/22/2018 3/21/2018 3/15/2018 3/14/2018 3/8/2018   Weight - 240 lb 6.4 oz - 243 lb 12.8 oz - 248 lb 12.8 oz -   Waist Measure Inches 47 - 46 - 49 - 48   Exercise Mins/week - - - - - - -   Body Fat % - - - - - - -   BMI - 37.65 kg/m2 - 38.18 kg/m2 - 38.97 kg/m2 -         Have you received any other medical care this week? no  If yes, where and for what? Have you had any change in your medications since your last visit? no  If yes what? Did you have any problems adhering to the program last week? no  If yes, please explain:       Eating Habits Over Last Week:  Did you take in 64 oz of non-caloric fluids? yes     Did you consume your 4 meal replacements each day?  yes       Physical Activity Over the Past Week:    Aerobic exercise: 0 min  Resistance exercise: 0 workouts / week

## 2018-04-04 ENCOUNTER — OFFICE VISIT (OUTPATIENT)
Dept: INTERNAL MEDICINE CLINIC | Age: 28
End: 2018-04-04

## 2018-04-04 VITALS
BODY MASS INDEX: 36.55 KG/M2 | TEMPERATURE: 98.2 F | HEIGHT: 67 IN | HEART RATE: 86 BPM | RESPIRATION RATE: 16 BRPM | WEIGHT: 232.9 LBS | OXYGEN SATURATION: 96 % | DIASTOLIC BLOOD PRESSURE: 82 MMHG | SYSTOLIC BLOOD PRESSURE: 128 MMHG

## 2018-04-04 DIAGNOSIS — R73.01 IMPAIRED FASTING GLUCOSE: ICD-10-CM

## 2018-04-04 DIAGNOSIS — E66.9 OBESITY (BMI 30-39.9): Primary | ICD-10-CM

## 2018-04-04 NOTE — PROGRESS NOTES
[facility] Progress Note:   F/up Physician Visit    Estuardo Kim is a 32 y.o. female who is here for her  f/up physician visit for the VLCD Program. Ricardo Kiser has completed 10 weeks of the program to date. 25 lbs weight loss since last visit. Weight History  starting weight 274.8 lb Body mass index is 43.04 kg/(m^2). Current weight 232 lbs  Goal weight 150 lb  Highest weight 274.8lb, at 32years old    EKG due 224 lbs    Weight Metrics 4/4/2018 4/4/2018 3/29/2018 3/28/2018 3/22/2018 3/21/2018 3/15/2018   Weight - 232 lb 14.4 oz - 240 lb 6.4 oz - 243 lb 12.8 oz -   Waist Measure Inches 41.6 - 47 - 46 - 49   Exercise Mins/week - - - - - - -   Body Fat % 40.1 - - - - - -   BMI - 36.48 kg/m2 - 37.65 kg/m2 - 38.18 kg/m2 -         Current Outpatient Prescriptions   Medication Sig Dispense Refill    LOW-OGESTREL, 28, 0.3-30 mg-mcg tab TK 1 T PO ONCE D  11    valACYclovir (VALTREX) 500 mg tablet TK 1 T PO  ONCE DAILY  10         Participation   Did you attend clinic and class last week? yes    Review of Systems  Since your last visit, have you experienced any complications? no  If yes, please list: n/a    No CP, SOB, palpitations, lightheadedness, dizziness, constipation. Positives highlight in BOLD. Are you taking an appetite suppressant? no  If so, is there any Chest Pain, Palpitations or Dizziness? BP Readings from Last 10 Encounters:   04/04/18 128/82   03/29/18 125/80   03/22/18 133/85   03/15/18 124/81   03/08/18 121/87   03/01/18 131/74   02/22/18 (!) 128/91   02/15/18 139/70   02/01/18 (!) 147/99         Have you received any other medical care this week? no  If yes, where and for what? Have you discontinued or changed any medicine or dose of your medicine since your last visit? no  If yes, where and for what? Diet  How many ounces of calorie-free liquids did you consume each day? 64 oz    How many meal replacements did you take each day?  4    Did you have any problems adhering to the program?  no If yes, please explain: n/a      Exercise  Aerobic exercise: 90 min  Resistance exercise: 0 workouts / week  Any discomfort?  no     If yes, where? Review of Systems  Complete ROS negative except where noted above    Objective  Visit Vitals    /82    Pulse 86    Temp 98.2 °F (36.8 °C)    Resp 16    Ht 5' 7\" (1.702 m)    Wt 232 lb 14.4 oz (105.6 kg)    LMP 04/03/2018    SpO2 96%    BMI 36.48 kg/m2     Patient's last menstrual period was 04/03/2018. Waist Circumference: I personally reviewed patient's Weight Management Doc Flowsheet  Neck Circumference: I personally reviewed patient's Weight Management Doc Flowsheet  Percent Body Fat: I personally reviewed patient's Weight Management Doc Flowsheet    Physical Exam  Appearance: well appearing , A&O, NAD  HEENT:  NC/AT, PERRL, No scleral icterus  Heart:  RRR without M/R/G  Lungs:  CTAB, no rhonchi, rales, or wheezes with good air exchange   Ext:  No LE Edema    Assessment / Plan    Encounter Diagnoses   Name Primary?  Obesity (BMI 30-39. 9) Yes    Impaired fasting glucose        1. Weight management well controlled   Progress was reviewed with patient    2. Labs    Latest results reviewed with patient   Lab slip given to pt for f/up HDL labs    3. Diet regimen   # of meal replacements prescribed: 4   If modified LCD-nutritional guidelines:    Monthly Goal   15 lbs    Medical monitoring schedule:   Weekly BP/Weight checks   Monthly provider appointments          Patient Instructions   GREAT JOB!!!    Keep up the terrific work,    Monthly goal:  15 lbs         Body Mass Index: Care Instructions  Your Care Instructions    Body mass index (BMI) can help you see if your weight is raising your risk for health problems. It uses a formula to compare how much you weigh with how tall you are. · A BMI lower than 18.5 is considered underweight. · A BMI between 18.5 and 24.9 is considered healthy.   · A BMI between 25 and 29.9 is considered overweight. A BMI of 30 or higher is considered obese. If your BMI is in the normal range, it means that you have a lower risk for weight-related health problems. If your BMI is in the overweight or obese range, you may be at increased risk for weight-related health problems, such as high blood pressure, heart disease, stroke, arthritis or joint pain, and diabetes. If your BMI is in the underweight range, you may be at increased risk for health problems such as fatigue, lower protection (immunity) against illness, muscle loss, bone loss, hair loss, and hormone problems. BMI is just one measure of your risk for weight-related health problems. You may be at higher risk for health problems if you are not active, you eat an unhealthy diet, or you drink too much alcohol or use tobacco products. Follow-up care is a key part of your treatment and safety. Be sure to make and go to all appointments, and call your doctor if you are having problems. It's also a good idea to know your test results and keep a list of the medicines you take. How can you care for yourself at home? · Practice healthy eating habits. This includes eating plenty of fruits, vegetables, whole grains, lean protein, and low-fat dairy. · If your doctor recommends it, get more exercise. Walking is a good choice. Bit by bit, increase the amount you walk every day. Try for at least 30 minutes on most days of the week. · Do not smoke. Smoking can increase your risk for health problems. If you need help quitting, talk to your doctor about stop-smoking programs and medicines. These can increase your chances of quitting for good. · Limit alcohol to 2 drinks a day for men and 1 drink a day for women. Too much alcohol can cause health problems. If you have a BMI higher than 25  · Your doctor may do other tests to check your risk for weight-related health problems.  This may include measuring the distance around your waist. A waist measurement of more than 40 inches in men or 35 inches in women can increase the risk of weight-related health problems. · Talk with your doctor about steps you can take to stay healthy or improve your health. You may need to make lifestyle changes to lose weight and stay healthy, such as changing your diet and getting regular exercise. If you have a BMI lower than 18.5  · Your doctor may do other tests to check your risk for health problems. · Talk with your doctor about steps you can take to stay healthy or improve your health. You may need to make lifestyle changes to gain or maintain weight and stay healthy, such as getting more healthy foods in your diet and doing exercises to build muscle. Where can you learn more? Go to http://aby-laura.info/. Enter S176 in the search box to learn more about \"Body Mass Index: Care Instructions. \"  Current as of: October 13, 2016  Content Version: 11.4  © 8435-4998 [employer]. Care instructions adapted under license by [employer] (which disclaims liability or warranty for this information). If you have questions about a medical condition or this instruction, always ask your healthcare professional. [place] [address] any warranty or liability for your use of this information. Follow-up Disposition:  Return in about 4 weeks (around 5/2/2018). 10 minutes of the 15 minutes face to face time with Ravindre Howell consisted of counseling & coordinating and/or discussing treatment plans in reference to her The primary encounter diagnosis was Obesity (BMI 30-39.9). A diagnosis of Impaired fasting glucose was also pertinent to this visit. The patient is to follow up as scheduled and will report to the ED or the office if symptoms change or increase. The patient has voiced understanding and will comply.

## 2018-04-04 NOTE — MR AVS SNAPSHOT
[facility] 
 
 
 [address] 
[phone] Patient: Tiffanie Blanca MRN: [id number] MNX:[id number] Visit Information Date & Time Provider Department Dept. Phone Encounter #  
 4/4/2018  8:00 AM Verito Peter NP [facility] [address] [place] [address] [phone] [id number] Follow-up Instructions Return in about 4 weeks (around 5/2/2018). Upcoming Health Maintenance Date Due DTaP/Tdap/Td series (1 - Tdap) 7/12/2011 PAP AKA CERVICAL CYTOLOGY 7/12/2011 Influenza Age 5 to Adult 8/1/2017 Allergies as of 4/4/2018  Review Complete On: 4/4/2018 By: Verito Peter NP No Known Allergies Current Immunizations  Never Reviewed No immunizations on file. Not reviewed this visit You Were Diagnosed With   
  
 Codes Comments Obesity (BMI 30-39.9)    -  Primary ICD-10-CM: K67.4 ICD-9-CM: 278.00 Impaired fasting glucose     ICD-10-CM: R73.01 
ICD-9-CM: 790.21 Vitals BP Pulse Temp Resp Height(growth percentile) Weight(growth percentile) 128/82 86 98.2 °F (36.8 °C) 16 5' 7\" (1.702 m) 232 lb 14.4 oz (105.6 kg) LMP SpO2 BMI OB Status Smoking Status 04/03/2018 96% 36.48 kg/m2 Having regular periods Never Smoker Vitals History BMI and BSA Data Body Mass Index Body Surface Area  
 36.48 kg/m 2 2.23 m 2 Your Updated Medication List  
  
   
This list is accurate as of 4/4/18  8:14 AM.  Always use your most recent med list.  
  
  
  
  
 LOW-OGESTREL (28) 0.3-30 mg-mcg Tab Generic drug:  norgestrel-ethinyl estradiol TK 1 T PO ONCE D  
  
 valACYclovir 500 mg tablet Commonly known as:  VALTREX TK 1 T PO  ONCE DAILY Follow-up Instructions Return in about 4 weeks (around 5/2/2018). To-Do List   
 04/04/2018 Lab:  METABOLIC PANEL, COMPREHENSIVE   
  
 04/04/2018 Lab:  URIC ACID Patient Instructions GREAT JOB!!! 
 
 Keep up the terrific work, 
 
Monthly goal:  15 lbs Body Mass Index: Care Instructions Your Care Instructions Body mass index (BMI) can help you see if your weight is raising your risk for health problems. It uses a formula to compare how much you weigh with how tall you are. · A BMI lower than 18.5 is considered underweight. · A BMI between 18.5 and 24.9 is considered healthy. · A BMI between 25 and 29.9 is considered overweight. A BMI of 30 or higher is considered obese. If your BMI is in the normal range, it means that you have a lower risk for weight-related health problems. If your BMI is in the overweight or obese range, you may be at increased risk for weight-related health problems, such as high blood pressure, heart disease, stroke, arthritis or joint pain, and diabetes. If your BMI is in the underweight range, you may be at increased risk for health problems such as fatigue, lower protection (immunity) against illness, muscle loss, bone loss, hair loss, and hormone problems. BMI is just one measure of your risk for weight-related health problems. You may be at higher risk for health problems if you are not active, you eat an unhealthy diet, or you drink too much alcohol or use tobacco products. Follow-up care is a key part of your treatment and safety. Be sure to make and go to all appointments, and call your doctor if you are having problems. It's also a good idea to know your test results and keep a list of the medicines you take. How can you care for yourself at home? · Practice healthy eating habits. This includes eating plenty of fruits, vegetables, whole grains, lean protein, and low-fat dairy. · If your doctor recommends it, get more exercise. Walking is a good choice. Bit by bit, increase the amount you walk every day. Try for at least 30 minutes on most days of the week. · Do not smoke. Smoking can increase your risk for health problems.  If you need help quitting, talk to your doctor about stop-smoking programs and medicines. These can increase your chances of quitting for good. · Limit alcohol to 2 drinks a day for men and 1 drink a day for women. Too much alcohol can cause health problems. If you have a BMI higher than 25 · Your doctor may do other tests to check your risk for weight-related health problems. This may include measuring the distance around your waist. A waist measurement of more than 40 inches in men or 35 inches in women can increase the risk of weight-related health problems. · Talk with your doctor about steps you can take to stay healthy or improve your health. You may need to make lifestyle changes to lose weight and stay healthy, such as changing your diet and getting regular exercise. If you have a BMI lower than 18.5 · Your doctor may do other tests to check your risk for health problems. · Talk with your doctor about steps you can take to stay healthy or improve your health. You may need to make lifestyle changes to gain or maintain weight and stay healthy, such as getting more healthy foods in your diet and doing exercises to build muscle. Where can you learn more? Go to http://aby-laura.info/. Enter S176 in the search box to learn more about \"Body Mass Index: Care Instructions. \" Current as of: October 13, 2016 Content Version: 11.4 © 4877-3911 Healthwise, Incorporated. Care instructions adapted under license by [employer] (which disclaims liability or warranty for this information). If you have questions about a medical condition or this instruction, always ask your healthcare professional. [place] [address] any warranty or liability for your use of this information. Introducing [facility]! Yimi Gunn introduces ATOMOO patient portal. Now you can access parts of your medical record, email your doctor's office, and request medication refills online. 1. In your internet browser, go to [email or web address] 2. Click on the First Time User? Click Here link in the Sign In box. You will see the New Member Sign Up page. 3. Enter your Welcu Access Code exactly as it appears below. You will not need to use this code after youve completed the sign-up process. If you do not sign up before the expiration date, you must request a new code. · Welcu Access Code: [email or web address] Expires: 5/2/2018  9:44 AM 
 
4. Enter the last four digits of your Social Security Number (xxxx) and Date of Birth (mm/dd/yyyy) as indicated and click Submit. You will be taken to the next sign-up page. 5. Create a [email or web address] ID. This will be your Welcu login ID and cannot be changed, so think of one that is secure and easy to remember. 6. Create a Welcu password. You can change your password at any time. 7. Enter your Password Reset Question and Answer. This can be used at a later time if you forget your password. 8. Enter your e-mail address. You will receive e-mail notification when new information is available in [address] 9. Click Sign Up. You can now view and download portions of your medical record. 10. Click the Download Summary menu link to download a portable copy of your medical information. If you have questions, please visit the Frequently Asked Questions section of the Welcu website. Remember, [employer] is NOT to be used for urgent needs. For medical emergencies, dial 911. Now available from your iPhone and Android! Please provide this summary of care documentation to your next provider. Your primary care clinician is listed as VERN RAMIREZ. If you have any questions after today's visit, please call [phone].

## 2018-04-04 NOTE — PATIENT INSTRUCTIONS
GREAT JOB!!!    Keep up the terrific work,    Monthly goal:  15 lbs         Body Mass Index: Care Instructions  Your Care Instructions    Body mass index (BMI) can help you see if your weight is raising your risk for health problems. It uses a formula to compare how much you weigh with how tall you are. · A BMI lower than 18.5 is considered underweight. · A BMI between 18.5 and 24.9 is considered healthy. · A BMI between 25 and 29.9 is considered overweight. A BMI of 30 or higher is considered obese. If your BMI is in the normal range, it means that you have a lower risk for weight-related health problems. If your BMI is in the overweight or obese range, you may be at increased risk for weight-related health problems, such as high blood pressure, heart disease, stroke, arthritis or joint pain, and diabetes. If your BMI is in the underweight range, you may be at increased risk for health problems such as fatigue, lower protection (immunity) against illness, muscle loss, bone loss, hair loss, and hormone problems. BMI is just one measure of your risk for weight-related health problems. You may be at higher risk for health problems if you are not active, you eat an unhealthy diet, or you drink too much alcohol or use tobacco products. Follow-up care is a key part of your treatment and safety. Be sure to make and go to all appointments, and call your doctor if you are having problems. It's also a good idea to know your test results and keep a list of the medicines you take. How can you care for yourself at home? · Practice healthy eating habits. This includes eating plenty of fruits, vegetables, whole grains, lean protein, and low-fat dairy. · If your doctor recommends it, get more exercise. Walking is a good choice. Bit by bit, increase the amount you walk every day. Try for at least 30 minutes on most days of the week. · Do not smoke. Smoking can increase your risk for health problems.  If you need help quitting, talk to your doctor about stop-smoking programs and medicines. These can increase your chances of quitting for good. · Limit alcohol to 2 drinks a day for men and 1 drink a day for women. Too much alcohol can cause health problems. If you have a BMI higher than 25  · Your doctor may do other tests to check your risk for weight-related health problems. This may include measuring the distance around your waist. A waist measurement of more than 40 inches in men or 35 inches in women can increase the risk of weight-related health problems. · Talk with your doctor about steps you can take to stay healthy or improve your health. You may need to make lifestyle changes to lose weight and stay healthy, such as changing your diet and getting regular exercise. If you have a BMI lower than 18.5  · Your doctor may do other tests to check your risk for health problems. · Talk with your doctor about steps you can take to stay healthy or improve your health. You may need to make lifestyle changes to gain or maintain weight and stay healthy, such as getting more healthy foods in your diet and doing exercises to build muscle. Where can you learn more? Go to http://aby-laura.info/. Enter S176 in the search box to learn more about \"Body Mass Index: Care Instructions. \"  Current as of: October 13, 2016  Content Version: 11.4  © 6823-7081 Healthwise, Incorporated. Care instructions adapted under license by [employer] (which disclaims liability or warranty for this information). If you have questions about a medical condition or this instruction, always ask your healthcare professional. [place] [address] any warranty or liability for your use of this information.

## 2018-04-11 ENCOUNTER — CLINICAL SUPPORT (OUTPATIENT)
Dept: FAMILY MEDICINE CLINIC | Age: 28
End: 2018-04-11

## 2018-04-11 DIAGNOSIS — E66.9 OBESITY (BMI 30-39.9): Primary | ICD-10-CM

## 2018-04-12 VITALS
HEIGHT: 67 IN | BODY MASS INDEX: 36.6 KG/M2 | HEART RATE: 72 BPM | DIASTOLIC BLOOD PRESSURE: 80 MMHG | SYSTOLIC BLOOD PRESSURE: 121 MMHG | WEIGHT: 233.2 LBS

## 2018-04-12 NOTE — PROGRESS NOTES
Progress Note: Weekly Education Class in the [facility]   Is there anything that you or the patient needs to let the [facility] Physician know about? no  Over the past week, have you experienced any side-effects? no    Tiffanie Blanca is a 32 y.o. female who is enrolled in [facility]    Visit Vitals    /80 (BP 1 Location: Right arm, BP Patient Position: Sitting)    Pulse 72    Ht 5' 7\" (1.702 m)    Wt 233 lb 3.2 oz (105.8 kg)    LMP 04/03/2018    BMI 36.52 kg/m2     Weight Metrics 4/12/2018 4/11/2018 4/4/2018 4/4/2018 3/29/2018 3/28/2018 3/22/2018   Weight - 233 lb 3.2 oz - 232 lb 14.4 oz - 240 lb 6.4 oz -   Waist Measure Inches 47 - 41.6 - 47 - 46   Exercise Mins/week - - - - - - -   Body Fat % - - 40.1 - - - -   BMI - 36.52 kg/m2 - 36.48 kg/m2 - 37.65 kg/m2 -         Have you received any other medical care this week? no  If yes, where and for what? Have you had any change in your medications since your last visit? no  If yes what? Did you have any problems adhering to the program last week? no  If yes, please explain:       Eating Habits Over Last Week:  Did you take in 64 oz of non-caloric fluids? yes     Did you consume your 4 meal replacements each day?  yes       Physical Activity Over the Past Week:    Aerobic exercise: 0 min  Resistance exercise: 0 workouts / week

## 2018-04-25 ENCOUNTER — CLINICAL SUPPORT (OUTPATIENT)
Dept: FAMILY MEDICINE CLINIC | Age: 28
End: 2018-04-25

## 2018-04-25 VITALS
SYSTOLIC BLOOD PRESSURE: 129 MMHG | HEART RATE: 74 BPM | HEIGHT: 67 IN | DIASTOLIC BLOOD PRESSURE: 86 MMHG | BODY MASS INDEX: 36.1 KG/M2 | WEIGHT: 230 LBS

## 2018-04-25 DIAGNOSIS — E66.9 OBESITY (BMI 30-39.9): Primary | ICD-10-CM

## 2018-04-25 NOTE — PROGRESS NOTES
Progress Note: Weekly Education Class in the [facility] Weight Loss Program   Is there anything that you or the patient needs to let the [facility] Physician know about? no  Over the past week, have you experienced any side-effects? no    Windy Montes De Oca is a 32 y.o. female who is enrolled in [facility]    Visit Vitals    /86 (BP 1 Location: Right arm, BP Patient Position: Sitting)    Pulse 74    Ht 5' 7\" (1.702 m)    Wt 230 lb (104.3 kg)    LMP 04/03/2018    BMI 36.02 kg/m2     Weight Metrics 4/25/2018 4/12/2018 4/11/2018 4/4/2018 4/4/2018 3/29/2018 3/28/2018   Weight 230 lb - 233 lb 3.2 oz - 232 lb 14.4 oz - 240 lb 6.4 oz   Waist Measure Inches 43 47 - 41.6 - 47 -   Exercise Mins/week - - - - - - -   Body Fat % - - - 40.1 - - -   BMI 36.02 kg/m2 - 36.52 kg/m2 - 36.48 kg/m2 - 37.65 kg/m2         Have you received any other medical care this week? no  If yes, where and for what? Have you had any change in your medications since your last visit? no  If yes what? Did you have any problems adhering to the program last week? no  If yes, please explain:       Eating Habits Over Last Week:  Did you take in 64 oz of non-caloric fluids? yes     Did you consume your 4 meal replacements each day?  yes       Physical Activity Over the Past Week:    Aerobic exercise: 120 min  Resistance exercise: 0 workouts / week

## 2018-04-26 ENCOUNTER — HOSPITAL ENCOUNTER (OUTPATIENT)
Dept: LAB | Age: 28
Discharge: HOME | End: 2018-04-26
Payer: COMMERCIAL

## 2018-04-26 DIAGNOSIS — E66.9 OBESITY (BMI 30-39.9): ICD-10-CM

## 2018-04-26 LAB
ALBUMIN SERPL-MCNC: 3.9 G/DL (ref 3.4–5)
ALBUMIN/GLOB SERPL: 1.1 {RATIO} (ref 0.8–1.7)
ALP SERPL-CCNC: 74 U/L (ref 45–117)
ALT SERPL-CCNC: 107 U/L (ref 13–56)
ANION GAP SERPL CALC-SCNC: 6 MMOL/L (ref 3–18)
AST SERPL-CCNC: 22 U/L (ref 15–37)
BILIRUB SERPL-MCNC: 0.2 MG/DL (ref 0.2–1)
BUN SERPL-MCNC: 14 MG/DL (ref 7–18)
BUN/CREAT SERPL: 21 (ref 12–20)
CALCIUM SERPL-MCNC: 9.2 MG/DL (ref 8.5–10.1)
CHLORIDE SERPL-SCNC: 108 MMOL/L (ref 100–108)
CO2 SERPL-SCNC: 26 MMOL/L (ref 21–32)
CREAT SERPL-MCNC: 0.66 MG/DL (ref 0.6–1.3)
GLOBULIN SER CALC-MCNC: 3.4 G/DL (ref 2–4)
GLUCOSE SERPL-MCNC: 98 MG/DL (ref 74–99)
POTASSIUM SERPL-SCNC: 4.6 MMOL/L (ref 3.5–5.5)
PROT SERPL-MCNC: 7.3 G/DL (ref 6.4–8.2)
SODIUM SERPL-SCNC: 140 MMOL/L (ref 136–145)
URATE SERPL-MCNC: 3.1 MG/DL (ref 2.6–7.2)

## 2018-04-26 PROCEDURE — 84550 ASSAY OF BLOOD/URIC ACID: CPT | Performed by: NURSE PRACTITIONER

## 2018-04-26 PROCEDURE — 80053 COMPREHEN METABOLIC PANEL: CPT | Performed by: NURSE PRACTITIONER

## 2018-04-26 PROCEDURE — 36415 COLL VENOUS BLD VENIPUNCTURE: CPT | Performed by: NURSE PRACTITIONER

## 2018-05-03 ENCOUNTER — OFFICE VISIT (OUTPATIENT)
Dept: INTERNAL MEDICINE CLINIC | Age: 28
End: 2018-05-03

## 2018-05-03 VITALS
DIASTOLIC BLOOD PRESSURE: 89 MMHG | OXYGEN SATURATION: 97 % | WEIGHT: 225 LBS | SYSTOLIC BLOOD PRESSURE: 138 MMHG | HEIGHT: 65 IN | BODY MASS INDEX: 37.49 KG/M2 | HEART RATE: 70 BPM | RESPIRATION RATE: 12 BRPM | TEMPERATURE: 97.8 F

## 2018-05-03 DIAGNOSIS — E66.9 OBESITY (BMI 30-39.9): Primary | ICD-10-CM

## 2018-05-03 DIAGNOSIS — R03.0 ELEVATED BLOOD PRESSURE READING: ICD-10-CM

## 2018-05-03 DIAGNOSIS — R73.01 IMPAIRED FASTING GLUCOSE: ICD-10-CM

## 2018-05-03 NOTE — PATIENT INSTRUCTIONS
Monthly goal:    10-15 lbs weight loss    Start incorporating exercise- walking 15 min 3x/week and then build from there. Body Mass Index: Care Instructions  Your Care Instructions    Body mass index (BMI) can help you see if your weight is raising your risk for health problems. It uses a formula to compare how much you weigh with how tall you are. · A BMI lower than 18.5 is considered underweight. · A BMI between 18.5 and 24.9 is considered healthy. · A BMI between 25 and 29.9 is considered overweight. A BMI of 30 or higher is considered obese. If your BMI is in the normal range, it means that you have a lower risk for weight-related health problems. If your BMI is in the overweight or obese range, you may be at increased risk for weight-related health problems, such as high blood pressure, heart disease, stroke, arthritis or joint pain, and diabetes. If your BMI is in the underweight range, you may be at increased risk for health problems such as fatigue, lower protection (immunity) against illness, muscle loss, bone loss, hair loss, and hormone problems. BMI is just one measure of your risk for weight-related health problems. You may be at higher risk for health problems if you are not active, you eat an unhealthy diet, or you drink too much alcohol or use tobacco products. Follow-up care is a key part of your treatment and safety. Be sure to make and go to all appointments, and call your doctor if you are having problems. It's also a good idea to know your test results and keep a list of the medicines you take. How can you care for yourself at home? · Practice healthy eating habits. This includes eating plenty of fruits, vegetables, whole grains, lean protein, and low-fat dairy. · If your doctor recommends it, get more exercise. Walking is a good choice. Bit by bit, increase the amount you walk every day. Try for at least 30 minutes on most days of the week. · Do not smoke.  Smoking can increase your risk for health problems. If you need help quitting, talk to your doctor about stop-smoking programs and medicines. These can increase your chances of quitting for good. · Limit alcohol to 2 drinks a day for men and 1 drink a day for women. Too much alcohol can cause health problems. If you have a BMI higher than 25  · Your doctor may do other tests to check your risk for weight-related health problems. This may include measuring the distance around your waist. A waist measurement of more than 40 inches in men or 35 inches in women can increase the risk of weight-related health problems. · Talk with your doctor about steps you can take to stay healthy or improve your health. You may need to make lifestyle changes to lose weight and stay healthy, such as changing your diet and getting regular exercise. If you have a BMI lower than 18.5  · Your doctor may do other tests to check your risk for health problems. · Talk with your doctor about steps you can take to stay healthy or improve your health. You may need to make lifestyle changes to gain or maintain weight and stay healthy, such as getting more healthy foods in your diet and doing exercises to build muscle. Where can you learn more? Go to http://aby-laura.info/. Enter S176 in the search box to learn more about \"Body Mass Index: Care Instructions. \"  Current as of: October 13, 2016  Content Version: 11.4  © 8187-4374 Healthwise, Incorporated. Care instructions adapted under license by [employer] (which disclaims liability or warranty for this information). If you have questions about a medical condition or this instruction, always ask your healthcare professional. [place] [address] any warranty or liability for your use of this information.

## 2018-05-03 NOTE — MR AVS SNAPSHOT
[address] [facility] 
 
 
 [address] [facility] [address], [address] [facility] 
[phone] Patient: Michale Garcia MRN: [id number] FNR:[id number] Visit Information Date & Time Provider Department Dept. Phone Encounter #  
 5/3/2018  8:00 AM Josr Jarrett NP [facility] [phone] Follow-up Instructions Return in about 4 weeks (around 5/31/2018). Your Appointments 5/30/2018  3:20 AM  
METABOLIC PROGRAM 15 with Josr Jarrett NP [facility] Appt Note: 4 week f/u  
 [address]  
  
   
 [address], [address] Upcoming Health Maintenance Date Due DTaP/Tdap/Td series (1 - Tdap) 7/12/2011 PAP AKA CERVICAL CYTOLOGY 7/12/2011 Influenza Age 5 to Adult 8/1/2018 Allergies as of 5/3/2018  Review Complete On: 5/3/2018 By: Josr Jarrett NP No Known Allergies Current Immunizations  Never Reviewed No immunizations on file. Not reviewed this visit You Were Diagnosed With   
  
 Codes Comments Obesity (BMI 30-39.9)    -  Primary ICD-10-CM: Y27.2 ICD-9-CM: 278.00 Elevated blood pressure reading     ICD-10-CM: R03.0 ICD-9-CM: 796.2 Impaired fasting glucose     ICD-10-CM: R73.01 
ICD-9-CM: 790.21 Vitals BP Pulse Temp Resp Height(growth percentile) Weight(growth percentile) 138/89 (BP 1 Location: Left arm, BP Patient Position: Sitting) 70 97.8 °F (36.6 °C) (Oral) 12 5' 4.96\" (1.65 m) 225 lb (102.1 kg) LMP SpO2 BMI OB Status Smoking Status 04/03/2018 97% 37.49 kg/m2 Having regular periods Never Smoker Vitals History BMI and BSA Data Body Mass Index Body Surface Area  
 37.49 kg/m 2 2.16 m 2 Your Updated Medication List  
  
   
This list is accurate as of 5/3/18  8:18 AM.  Always use your most recent med list.  
  
  
  
  
 LOW-OGESTREL (28) 0.3-30 mg-mcg Tab Generic drug:  norgestrel-ethinyl estradiol TK 1 T PO ONCE D  
  
 valACYclovir 500 mg tablet Commonly known as:  VALTREX TK 1 T PO  ONCE DAILY Follow-up Instructions Return in about 4 weeks (around 5/31/2018). To-Do List   
 05/03/2018 Lab:  HEMOGLOBIN A1C WITH EAG   
  
 05/03/2018 Lab:  METABOLIC PANEL, COMPREHENSIVE   
  
 05/03/2018 Lab:  URIC ACID Patient Instructions Monthly goal: 
 
10-15 lbs weight loss Start incorporating exercise- walking 15 min 3x/week and then build from there. Body Mass Index: Care Instructions Your Care Instructions Body mass index (BMI) can help you see if your weight is raising your risk for health problems. It uses a formula to compare how much you weigh with how tall you are. · A BMI lower than 18.5 is considered underweight. · A BMI between 18.5 and 24.9 is considered healthy. · A BMI between 25 and 29.9 is considered overweight. A BMI of 30 or higher is considered obese. If your BMI is in the normal range, it means that you have a lower risk for weight-related health problems. If your BMI is in the overweight or obese range, you may be at increased risk for weight-related health problems, such as high blood pressure, heart disease, stroke, arthritis or joint pain, and diabetes. If your BMI is in the underweight range, you may be at increased risk for health problems such as fatigue, lower protection (immunity) against illness, muscle loss, bone loss, hair loss, and hormone problems. BMI is just one measure of your risk for weight-related health problems. You may be at higher risk for health problems if you are not active, you eat an unhealthy diet, or you drink too much alcohol or use tobacco products. Follow-up care is a key part of your treatment and safety.  Be sure to make and go to all appointments, and call your doctor if you are having problems. It's also a good idea to know your test results and keep a list of the medicines you take. How can you care for yourself at home? · Practice healthy eating habits. This includes eating plenty of fruits, vegetables, whole grains, lean protein, and low-fat dairy. · If your doctor recommends it, get more exercise. Walking is a good choice. Bit by bit, increase the amount you walk every day. Try for at least 30 minutes on most days of the week. · Do not smoke. Smoking can increase your risk for health problems. If you need help quitting, talk to your doctor about stop-smoking programs and medicines. These can increase your chances of quitting for good. · Limit alcohol to 2 drinks a day for men and 1 drink a day for women. Too much alcohol can cause health problems. If you have a BMI higher than 25 · Your doctor may do other tests to check your risk for weight-related health problems. This may include measuring the distance around your waist. A waist measurement of more than 40 inches in men or 35 inches in women can increase the risk of weight-related health problems. · Talk with your doctor about steps you can take to stay healthy or improve your health. You may need to make lifestyle changes to lose weight and stay healthy, such as changing your diet and getting regular exercise. If you have a BMI lower than 18.5 · Your doctor may do other tests to check your risk for health problems. · Talk with your doctor about steps you can take to stay healthy or improve your health. You may need to make lifestyle changes to gain or maintain weight and stay healthy, such as getting more healthy foods in your diet and doing exercises to build muscle. Where can you learn more? Go to http://aby-laura.info/. Enter S176 in the search box to learn more about \"Body Mass Index: Care Instructions. \" Current as of: October 13, 2016 Content Version: 11.4 © 5955-5666 Healthwise, Incorporated. Care instructions adapted under license by [employer] (which disclaims liability or warranty for this information). If you have questions about a medical condition or this instruction, always ask your healthcare professional. [address] any warranty or liability for your use of this information. Introducing [facility]! Karrie Martinez introduces [employer] patient portal. Now you can access parts of your medical record, email your doctor's office, and request medication refills online. 1. In your internet browser, go to [email or web address] 2. Click on the First Time User? Click Here link in the Sign In box. You will see the New Member Sign Up page. 3. Enter your [employer] Access Code exactly as it appears below. You will not need to use this code after youve completed the sign-up process. If you do not sign up before the expiration date, you must request a new code. · [employer] Access Code: [email or web address] Expires: 8/1/2018  8:18 AM 
 
4. Enter the last four digits of your Social Security Number (xxxx) and Date of Birth (mm/dd/yyyy) as indicated and click Submit. You will be taken to the next sign-up page. 5. Create a [employer] ID. This will be your [employer] login ID and cannot be changed, so think of one that is secure and easy to remember. 6. Create a [employer] password. You can change your password at any time. 7. Enter your Password Reset Question and Answer. This can be used at a later time if you forget your password. 8. Enter your e-mail address. You will receive e-mail notification when new information is available in [address] 9. Click Sign Up. You can now view and download portions of your medical record. 10. Click the Download Summary menu link to download a portable copy of your medical information.  
 
If you have questions, please visit the Frequently Asked Questions section of the [employer]. Remember, [email or web address] is NOT to be used for urgent needs. For medical emergencies, dial 911. Now available from your iPhone and Android! Please provide this summary of care documentation to your next provider. Your primary care clinician is listed as VERN RAMIREZ. If you have any questions after today's visit, please call [phone].

## 2018-05-03 NOTE — PROGRESS NOTES
[facility] Progress Note:   F/up Physician Visit    CC: Obesity      Gian Moctezuma is a 32 y.o. female who is here for her  f/up physician visit for the VLCD Program. Mario Oliveira has completed 13 weeks of the program to date. 7 lbs weight loss, 49 lbs down. Doing well, feeling well. One week issues sticking with program.  \"my sister and I are bad influences and good influences on each other\". Weight History  starting weight 274.8 lb Body mass index is 43.04 kg/(m^2). Current weight 225 lbs  Goal weight 150 lb  Highest weight 274.8lb, at 32years old     EKG due 224 lbs    Weight Metrics 5/3/2018 5/3/2018 4/25/2018 4/12/2018 4/11/2018 4/4/2018 4/4/2018   Weight - 225 lb 230 lb - 233 lb 3.2 oz - 232 lb 14.4 oz   Waist Measure Inches 41 - 43 47 - 41.6 -   Exercise Mins/week 60 - - - - - -   Body Fat % 41.3 - - - - 40.1 -   BMI - 37.49 kg/m2 36.02 kg/m2 - 36.52 kg/m2 - 36.48 kg/m2         Current Outpatient Prescriptions   Medication Sig Dispense Refill    LOW-OGESTREL, 28, 0.3-30 mg-mcg tab TK 1 T PO ONCE D  11    valACYclovir (VALTREX) 500 mg tablet TK 1 T PO  ONCE DAILY  10         Participation   Did you attend clinic and class last week? yes    Review of Systems  Since your last visit, have you experienced any complications? no  If yes, please list:     No CP, SOB, palpitations, lightheadedness, dizziness, constipation. Positives highlight in BOLD. Are you taking an appetite suppressant? no  If so, is there any Chest Pain, Palpitations or Dizziness? BP Readings from Last 10 Encounters:   05/03/18 138/89   04/25/18 129/86   04/12/18 121/80   04/04/18 128/82   03/29/18 125/80   03/22/18 133/85   03/15/18 124/81   03/08/18 121/87   03/01/18 131/74   02/22/18 (!) 128/91         Have you received any other medical care this week? no  If yes, where and for what?        Have you discontinued or changed any medicine or dose of your medicine since your last visit? no  If yes, where and for what? Diet  How many ounces of calorie-free liquids did you consume each day?  100 oz    How many meal replacements did you take each day? 4    Did you have any problems adhering to the program?  yes If yes, please explain: sometimes      Exercise  Aerobic exercise: 60 min  Resistance exercise: 0 workouts / week  Any discomfort?  no     If yes, where? Review of Systems  Complete ROS negative except where noted above    Objective  Visit Vitals    /89 (BP 1 Location: Left arm, BP Patient Position: Sitting)    Pulse 70    Temp 97.8 °F (36.6 °C) (Oral)    Resp 12    Ht 5' 4.96\" (1.65 m)    Wt 225 lb (102.1 kg)    LMP 04/03/2018    SpO2 97%    BMI 37.49 kg/m2     Patient's last menstrual period was 04/03/2018. PHQ over the last two weeks 2/1/2018   Little interest or pleasure in doing things Not at all   Feeling down, depressed or hopeless Several days   Total Score PHQ 2 1         Waist Circumference: I personally reviewed patient's Weight Management Doc Flowsheet  Neck Circumference: I personally reviewed patient's Weight Management Doc Flowsheet  Percent Body Fat: I personally reviewed patient's Weight Management Doc Flowsheet    Physical Exam  Appearance: well appearing, obese, A&O, NAD  HEENT:  NC/AT, PERRL, No scleral icterus  Heart:  RRR without M/R/G  Lungs:  CTAB, no rhonchi, rales, or wheezes with good air exchange   Ext:  No LE Edema    Assessment / Plan    Encounter Diagnoses   Name Primary?  Obesity (BMI 30-39. 9) Yes    Elevated blood pressure reading     Impaired fasting glucose        1. Weight management well controlled   Progress was reviewed with patient    2. Labs    Latest results reviewed with patient   Lab slip given to pt for f/up HDL labs    3.  Diet regimen   # of meal replacements prescribed: 4   If modified LCD-nutritional guidelines:    Monthly Goal   As below    Medical monitoring schedule:   Weekly BP/Weight checks   Monthly provider appointments  I have reviewed/discussed the above normal BMI with the patient. I have recommended the following interventions: dietary management education, guidance, and counseling, encourage exercise, monitor weight and prescribed dietary intake . .      Ms. Ruchi Julien has a reminder for a \"due or due soon\" health maintenance. I have asked that she contact her primary care provider for follow-up on this health maintenance. Patient Instructions   Monthly goal:    10-15 lbs weight loss    Start incorporating exercise- walking 15 min 3x/week and then build from there. Body Mass Index: Care Instructions  Your Care Instructions    Body mass index (BMI) can help you see if your weight is raising your risk for health problems. It uses a formula to compare how much you weigh with how tall you are. · A BMI lower than 18.5 is considered underweight. · A BMI between 18.5 and 24.9 is considered healthy. · A BMI between 25 and 29.9 is considered overweight. A BMI of 30 or higher is considered obese. If your BMI is in the normal range, it means that you have a lower risk for weight-related health problems. If your BMI is in the overweight or obese range, you may be at increased risk for weight-related health problems, such as high blood pressure, heart disease, stroke, arthritis or joint pain, and diabetes. If your BMI is in the underweight range, you may be at increased risk for health problems such as fatigue, lower protection (immunity) against illness, muscle loss, bone loss, hair loss, and hormone problems. BMI is just one measure of your risk for weight-related health problems. You may be at higher risk for health problems if you are not active, you eat an unhealthy diet, or you drink too much alcohol or use tobacco products. Follow-up care is a key part of your treatment and safety. Be sure to make and go to all appointments, and call your doctor if you are having problems.  It's also a good idea to know your test results and keep a list of the medicines you take. How can you care for yourself at home? · Practice healthy eating habits. This includes eating plenty of fruits, vegetables, whole grains, lean protein, and low-fat dairy. · If your doctor recommends it, get more exercise. Walking is a good choice. Bit by bit, increase the amount you walk every day. Try for at least 30 minutes on most days of the week. · Do not smoke. Smoking can increase your risk for health problems. If you need help quitting, talk to your doctor about stop-smoking programs and medicines. These can increase your chances of quitting for good. · Limit alcohol to 2 drinks a day for men and 1 drink a day for women. Too much alcohol can cause health problems. If you have a BMI higher than 25  · Your doctor may do other tests to check your risk for weight-related health problems. This may include measuring the distance around your waist. A waist measurement of more than 40 inches in men or 35 inches in women can increase the risk of weight-related health problems. · Talk with your doctor about steps you can take to stay healthy or improve your health. You may need to make lifestyle changes to lose weight and stay healthy, such as changing your diet and getting regular exercise. If you have a BMI lower than 18.5  · Your doctor may do other tests to check your risk for health problems. · Talk with your doctor about steps you can take to stay healthy or improve your health. You may need to make lifestyle changes to gain or maintain weight and stay healthy, such as getting more healthy foods in your diet and doing exercises to build muscle. Where can you learn more? Go to http://aby-laura.info/. Enter S176 in the search box to learn more about \"Body Mass Index: Care Instructions. \"  Current as of: October 13, 2016  Content Version: 11.4  © 8607-2581 Healthwise, [employer].  Care instructions adapted under license by [employer] (which disclaims liability or warranty for this information). If you have questions about a medical condition or this instruction, always ask your healthcare professional. [address]  any warranty or liability for your use of this information. Follow-up Disposition:  Return in about 4 weeks (around 5/31/2018). 10 minutes of the 15 minutes face to face time with Martha Muhammad consisted of counseling & coordinating and/or discussing treatment plans in reference to her The primary encounter diagnosis was Obesity (BMI 30-39.9). Diagnoses of Elevated blood pressure reading and Impaired fasting glucose were also pertinent to this visit. The patient is to follow up as scheduled and will report to the ED or the office if symptoms change or increase. The patient has voiced understanding and will comply.

## 2018-05-03 NOTE — PROGRESS NOTES
Chief Complaint   Patient presents with    Weight Management     1. Have you been to the ER, urgent care clinic since your last visit? Hospitalized since your last visit? No    2. Have you seen or consulted any other health care providers outside of the [address] [place] [address] since your last visit? Include any pap smears or colon screening.  No

## 2018-05-09 ENCOUNTER — CLINICAL SUPPORT (OUTPATIENT)
Dept: FAMILY MEDICINE CLINIC | Age: 28
End: 2018-05-09

## 2018-05-09 DIAGNOSIS — E66.9 OBESITY (BMI 30-39.9): Primary | ICD-10-CM

## 2018-05-10 VITALS
HEART RATE: 101 BPM | SYSTOLIC BLOOD PRESSURE: 131 MMHG | DIASTOLIC BLOOD PRESSURE: 92 MMHG | WEIGHT: 225.4 LBS | RESPIRATION RATE: 20 BRPM | BODY MASS INDEX: 38.48 KG/M2 | HEIGHT: 64 IN

## 2018-05-10 NOTE — PROGRESS NOTES
Progress Note: Weekly Education Class in the [facility]   Is there anything that you or the patient needs to let the [facility] Physician know about? no  Over the past week, have you experienced any side-effects? no    Oscar Jose is a 32 y.o. female who is enrolled in [facility]    Visit Vitals    BP (!) 131/92 (BP 1 Location: Right arm, BP Patient Position: Sitting)  Comment: states she feels fine    Pulse (!) 101    Resp 20    Ht 5' 4\" (1.626 m)    Wt 225 lb 6.4 oz (102.2 kg)    BMI 38.69 kg/m2     Weight Metrics 5/10/2018 5/9/2018 5/3/2018 5/3/2018 4/25/2018 4/12/2018 4/11/2018   Weight - 225 lb 6.4 oz - 225 lb 230 lb - 233 lb 3.2 oz   Waist Measure Inches 44 - 41 - 43 47 -   Exercise Mins/week - - 60 - - - -   Body Fat % - - 41.3 - - - -   BMI - 38.69 kg/m2 - 37.49 kg/m2 36.02 kg/m2 - 36.52 kg/m2         Have you received any other medical care this week? no  If yes, where and for what? Have you had any change in your medications since your last visit? no  If yes what? Did you have any problems adhering to the program last week? no  If yes, please explain:       Eating Habits Over Last Week:  Did you take in 64 oz of non-caloric fluids? yes     Did you consume your 4 meal replacements each day?  yes       Physical Activity Over the Past Week:    Aerobic exercise: 180 min  Resistance exercise: 0 workouts / week

## 2018-05-23 ENCOUNTER — CLINICAL SUPPORT (OUTPATIENT)
Dept: FAMILY MEDICINE CLINIC | Age: 28
End: 2018-05-23

## 2018-05-23 VITALS
WEIGHT: 228 LBS | HEIGHT: 64 IN | HEART RATE: 96 BPM | DIASTOLIC BLOOD PRESSURE: 82 MMHG | BODY MASS INDEX: 38.93 KG/M2 | SYSTOLIC BLOOD PRESSURE: 137 MMHG

## 2018-05-23 DIAGNOSIS — E66.9 OBESITY (BMI 30-39.9): Primary | ICD-10-CM

## 2018-05-23 NOTE — PROGRESS NOTES
Progress Note: Weekly Education Class in the [facility] Loss Program   Is there anything that you or the patient needs to let the [facility] Physician know about? no  Over the past week, have you experienced any side-effects? no    Todd Sifuentes is a 32 y.o. female who is enrolled in [facility]    Visit Vitals    /82 (BP 1 Location: Right arm, BP Patient Position: Sitting)    Pulse 96    Ht 5' 4\" (1.626 m)    Wt 228 lb (103.4 kg)    BMI 39.14 kg/m2     Weight Metrics 5/23/2018 5/10/2018 5/9/2018 5/3/2018 5/3/2018 4/25/2018 4/12/2018   Weight 228 lb - 225 lb 6.4 oz - 225 lb 230 lb -   Waist Measure Inches 46 44 - 41 - 43 47   Exercise Mins/week - - - 60 - - -   Body Fat % - - - 41.3 - - -   BMI 39.14 kg/m2 - 38.69 kg/m2 - 37.49 kg/m2 36.02 kg/m2 -         Have you received any other medical care this week? no  If yes, where and for what? Have you had any change in your medications since your last visit? no  If yes what? Did you have any problems adhering to the program last week? no  If yes, please explain:       Eating Habits Over Last Week:  Did you take in 64 oz of non-caloric fluids? yes     Did you consume your 4 meal replacements each day?  yes       Physical Activity Over the Past Week:    Aerobic exercise: 0 min  Resistance exercise: 0 workouts / week

## 2018-05-30 ENCOUNTER — CLINICAL SUPPORT (OUTPATIENT)
Dept: FAMILY MEDICINE CLINIC | Age: 28
End: 2018-05-30

## 2018-05-30 VITALS
HEIGHT: 64 IN | BODY MASS INDEX: 38.72 KG/M2 | SYSTOLIC BLOOD PRESSURE: 118 MMHG | HEART RATE: 88 BPM | DIASTOLIC BLOOD PRESSURE: 84 MMHG | WEIGHT: 226.8 LBS

## 2018-05-30 DIAGNOSIS — E66.9 OBESITY (BMI 30-39.9): Primary | ICD-10-CM

## 2018-05-30 NOTE — PROGRESS NOTES
Progress Note: Weekly Education Class in the [facility] Loss Program   Is there anything that you or the patient needs to let the [facility] Physician know about? no  Over the past week, have you experienced any side-effects? no    Mila Fu is a 32 y.o. female who is enrolled in [facility]    Visit Vitals    /84 (BP 1 Location: Right arm, BP Patient Position: Sitting)    Pulse 88    Ht 5' 4\" (1.626 m)    Wt 226 lb 12.8 oz (102.9 kg)    BMI 38.93 kg/m2     Weight Metrics 5/30/2018 5/23/2018 5/10/2018 5/9/2018 5/3/2018 5/3/2018 4/25/2018   Weight 226 lb 12.8 oz 228 lb - 225 lb 6.4 oz - 225 lb 230 lb   Waist Measure Inches 45 46 44 - 41 - 43   Exercise Mins/week - - - - 60 - -   Body Fat % - - - - 41.3 - -   BMI 38.93 kg/m2 39.14 kg/m2 - 38.69 kg/m2 - 37.49 kg/m2 36.02 kg/m2         Have you received any other medical care this week? no  If yes, where and for what? Have you had any change in your medications since your last visit? no  If yes what? Did you have any problems adhering to the program last week? no  If yes, please explain:       Eating Habits Over Last Week:  Did you take in 64 oz of non-caloric fluids? yes     Did you consume your 4 meal replacements each day?  yes       Physical Activity Over the Past Week:    Aerobic exercise: 0 min  Resistance exercise: 0 workouts / week

## 2018-06-02 ENCOUNTER — HOSPITAL ENCOUNTER (OUTPATIENT)
Dept: LAB | Age: 28
Discharge: HOME | End: 2018-06-02
Payer: COMMERCIAL

## 2018-06-02 DIAGNOSIS — R73.01 IMPAIRED FASTING GLUCOSE: ICD-10-CM

## 2018-06-02 DIAGNOSIS — E66.9 OBESITY (BMI 30-39.9): ICD-10-CM

## 2018-06-02 LAB
ALBUMIN SERPL-MCNC: 3.7 G/DL (ref 3.4–5)
ALBUMIN/GLOB SERPL: 1.1 {RATIO} (ref 0.8–1.7)
ALP SERPL-CCNC: 65 U/L (ref 45–117)
ALT SERPL-CCNC: 27 U/L (ref 13–56)
ANION GAP SERPL CALC-SCNC: 7 MMOL/L (ref 3–18)
AST SERPL-CCNC: 13 U/L (ref 15–37)
BILIRUB SERPL-MCNC: 0.2 MG/DL (ref 0.2–1)
BUN SERPL-MCNC: 13 MG/DL (ref 7–18)
BUN/CREAT SERPL: 19 (ref 12–20)
CALCIUM SERPL-MCNC: 8.6 MG/DL (ref 8.5–10.1)
CHLORIDE SERPL-SCNC: 107 MMOL/L (ref 100–108)
CO2 SERPL-SCNC: 27 MMOL/L (ref 21–32)
CREAT SERPL-MCNC: 0.7 MG/DL (ref 0.6–1.3)
EST. AVERAGE GLUCOSE BLD GHB EST-MCNC: 120 MG/DL
GLOBULIN SER CALC-MCNC: 3.3 G/DL (ref 2–4)
GLUCOSE SERPL-MCNC: 91 MG/DL (ref 74–99)
HBA1C MFR BLD: 5.8 % (ref 4.2–5.6)
POTASSIUM SERPL-SCNC: 4.4 MMOL/L (ref 3.5–5.5)
PROT SERPL-MCNC: 7 G/DL (ref 6.4–8.2)
SODIUM SERPL-SCNC: 141 MMOL/L (ref 136–145)
URATE SERPL-MCNC: 3.6 MG/DL (ref 2.6–7.2)

## 2018-06-02 PROCEDURE — 83036 HEMOGLOBIN GLYCOSYLATED A1C: CPT | Performed by: NURSE PRACTITIONER

## 2018-06-02 PROCEDURE — 80053 COMPREHEN METABOLIC PANEL: CPT | Performed by: NURSE PRACTITIONER

## 2018-06-02 PROCEDURE — 36415 COLL VENOUS BLD VENIPUNCTURE: CPT | Performed by: NURSE PRACTITIONER

## 2018-06-02 PROCEDURE — 84550 ASSAY OF BLOOD/URIC ACID: CPT | Performed by: NURSE PRACTITIONER

## 2018-06-06 ENCOUNTER — OFFICE VISIT (OUTPATIENT)
Dept: INTERNAL MEDICINE CLINIC | Age: 28
End: 2018-06-06

## 2018-06-06 VITALS
DIASTOLIC BLOOD PRESSURE: 78 MMHG | HEART RATE: 77 BPM | RESPIRATION RATE: 14 BRPM | WEIGHT: 219.6 LBS | HEIGHT: 64 IN | SYSTOLIC BLOOD PRESSURE: 120 MMHG | OXYGEN SATURATION: 97 % | TEMPERATURE: 98.5 F | BODY MASS INDEX: 37.49 KG/M2

## 2018-06-06 DIAGNOSIS — E66.9 OBESITY (BMI 30-39.9): Primary | ICD-10-CM

## 2018-06-06 DIAGNOSIS — R73.01 IMPAIRED FASTING GLUCOSE: ICD-10-CM

## 2018-06-06 DIAGNOSIS — R63.4 RAPID WEIGHT LOSS: ICD-10-CM

## 2018-06-06 DIAGNOSIS — R03.0 ELEVATED BLOOD PRESSURE READING: ICD-10-CM

## 2018-06-06 NOTE — PATIENT INSTRUCTIONS
Health Maintenance Due   Topic Date Due    DTaP/Tdap/Td series (1 - Tdap) 07/12/2011    PAP AKA CERVICAL CYTOLOGY  07/12/2011     Monthly goal:    8-12 lbs weight loss, you can shoot for 15! Walking 3-4x/week       Body Mass Index: Care Instructions  Your Care Instructions    Body mass index (BMI) can help you see if your weight is raising your risk for health problems. It uses a formula to compare how much you weigh with how tall you are. · A BMI lower than 18.5 is considered underweight. · A BMI between 18.5 and 24.9 is considered healthy. · A BMI between 25 and 29.9 is considered overweight. A BMI of 30 or higher is considered obese. If your BMI is in the normal range, it means that you have a lower risk for weight-related health problems. If your BMI is in the overweight or obese range, you may be at increased risk for weight-related health problems, such as high blood pressure, heart disease, stroke, arthritis or joint pain, and diabetes. If your BMI is in the underweight range, you may be at increased risk for health problems such as fatigue, lower protection (immunity) against illness, muscle loss, bone loss, hair loss, and hormone problems. BMI is just one measure of your risk for weight-related health problems. You may be at higher risk for health problems if you are not active, you eat an unhealthy diet, or you drink too much alcohol or use tobacco products. Follow-up care is a key part of your treatment and safety. Be sure to make and go to all appointments, and call your doctor if you are having problems. It's also a good idea to know your test results and keep a list of the medicines you take. How can you care for yourself at home? · Practice healthy eating habits. This includes eating plenty of fruits, vegetables, whole grains, lean protein, and low-fat dairy. · If your doctor recommends it, get more exercise. Walking is a good choice. Bit by bit, increase the amount you walk every day. Try for at least 30 minutes on most days of the week. · Do not smoke. Smoking can increase your risk for health problems. If you need help quitting, talk to your doctor about stop-smoking programs and medicines. These can increase your chances of quitting for good. · Limit alcohol to 2 drinks a day for men and 1 drink a day for women. Too much alcohol can cause health problems. If you have a BMI higher than 25  · Your doctor may do other tests to check your risk for weight-related health problems. This may include measuring the distance around your waist. A waist measurement of more than 40 inches in men or 35 inches in women can increase the risk of weight-related health problems. · Talk with your doctor about steps you can take to stay healthy or improve your health. You may need to make lifestyle changes to lose weight and stay healthy, such as changing your diet and getting regular exercise. If you have a BMI lower than 18.5  · Your doctor may do other tests to check your risk for health problems. · Talk with your doctor about steps you can take to stay healthy or improve your health. You may need to make lifestyle changes to gain or maintain weight and stay healthy, such as getting more healthy foods in your diet and doing exercises to build muscle. Where can you learn more? Go to http://bay-laura.info/. Enter S176 in the search box to learn more about \"Body Mass Index: Care Instructions. \"  Current as of: October 13, 2016  Content Version: 11.4  © 7985-7848 Healthwise, Incorporated. Care instructions adapted under license by [employer] (which disclaims liability or warranty for this information). If you have questions about a medical condition or this instruction, always ask your healthcare professional. [address] any warranty or liability for your use of this information.

## 2018-06-06 NOTE — PROGRESS NOTES
[facility] Progress Note:   F/up Physician Visit    CC: Obesity      Yulisa Arriaga is a 32 y.o. female who is here for her  f/up physician visit for the VLCD Program. Priscila Austin has completed 18 weeks of the program to date. 6 lbs weight loss    Weight History  starting weight 274.8 lb Body mass index is 43.04 kg/(m^2). Current weight 219 lbs  Goal weight 150 lb  Highest weight 274.8lb, at 32years old      EKG due 269 lbs    Weight Metrics 6/6/2018 6/6/2018 5/30/2018 5/23/2018 5/10/2018 5/9/2018 5/3/2018   Weight - 219 lb 9.6 oz 226 lb 12.8 oz 228 lb - 225 lb 6.4 oz -   Waist Measure Inches 44 - 45 46 44 - 41   Exercise Mins/week 60 - - - - - 60   Body Fat % 40.6 - - - - - 41.3   BMI - 37.69 kg/m2 38.93 kg/m2 39.14 kg/m2 - 38.69 kg/m2 -         Current Outpatient Prescriptions   Medication Sig Dispense Refill    LOW-OGESTREL, 28, 0.3-30 mg-mcg tab TK 1 T PO ONCE D  11    valACYclovir (VALTREX) 500 mg tablet TK 1 T PO  ONCE DAILY  10         Participation   Did you attend clinic and class last week? yes    Review of Systems  Since your last visit, have you experienced any complications? no  If yes, please list:     No CP, SOB, palpitations, lightheadedness, dizziness, constipation. Positives highlight in BOLD. Are you taking an appetite suppressant? no  If so, is there any Chest Pain, Palpitations or Dizziness? BP Readings from Last 10 Encounters:   06/06/18 120/78   05/30/18 118/84   05/23/18 137/82   05/10/18 (!) 131/92   05/03/18 138/89   04/25/18 129/86   04/12/18 121/80   04/04/18 128/82   03/29/18 125/80   03/22/18 133/85         Have you received any other medical care this week? no  If yes, where and for what? Have you discontinued or changed any medicine or dose of your medicine since your last visit? no  If yes, where and for what?          Diet  How many ounces of calorie-free liquids did you consume each day?  100 oz    How many meal replacements did you take each day? 4    Did you have any problems adhering to the program?  yes If yes, please explain: sometimes      Exercise  Aerobic exercise: 60 min  Resistance exercise: 0 workouts / week  Any discomfort?  no     If yes, where? Review of Systems  Complete ROS negative except where noted above    Objective  Visit Vitals    /78 (BP 1 Location: Right arm, BP Patient Position: Sitting)    Pulse 77    Temp 98.5 °F (36.9 °C) (Oral)    Resp 14    Ht 5' 4\" (1.626 m)    Wt 219 lb 9.6 oz (99.6 kg)    LMP 06/01/2018    SpO2 97%    BMI 37.69 kg/m2     Patient's last menstrual period was 06/01/2018. PHQ over the last two weeks 2/1/2018   Little interest or pleasure in doing things Not at all   Feeling down, depressed or hopeless Several days   Total Score PHQ 2 1         Waist Circumference: I personally reviewed patient's Weight Management Doc Flowsheet  Neck Circumference: I personally reviewed patient's Weight Management Doc Flowsheet  Percent Body Fat: I personally reviewed patient's Weight Management Doc Flowsheet    Physical Exam  Appearance: well appearing, obese, A&O, NAD  HEENT:  NC/AT, PERRL, No scleral icterus  Heart:  RRR without M/R/G  Lungs:  CTAB, no rhonchi, rales, or wheezes with good air exchange   Ext:  No LE Edema    Assessment / Plan    Encounter Diagnoses   Name Primary?  Obesity (BMI 30-39. 9) Yes    Impaired fasting glucose     Elevated blood pressure reading     Rapid weight loss        1. Weight management reasonably well controlled   Progress was reviewed with patient    2. Labs    Latest results reviewed with patient   Lab slip given to pt for f/up HDL labs    3. Diet regimen   # of meal replacements prescribed: 4   If modified LCD-nutritional guidelines:    Monthly Goal   As below    Medical monitoring schedule:   Weekly BP/Weight checks   Monthly provider appointments  I have reviewed/discussed the above normal BMI with the patient.   I have recommended the following interventions: dietary management education, guidance, and counseling, monitor weight and prescribed dietary intake . .      Ms. Francesco Garrido has a reminder for a \"due or due soon\" health maintenance. I have asked that she contact her primary care provider for follow-up on this health maintenance. Patient Instructions     Health Maintenance Due   Topic Date Due    DTaP/Tdap/Td series (1 - Tdap) 07/12/2011    PAP AKA CERVICAL CYTOLOGY  07/12/2011     Monthly goal:    8-12 lbs weight loss    Walking 3-4x/week       Body Mass Index: Care Instructions  Your Care Instructions    Body mass index (BMI) can help you see if your weight is raising your risk for health problems. It uses a formula to compare how much you weigh with how tall you are. · A BMI lower than 18.5 is considered underweight. · A BMI between 18.5 and 24.9 is considered healthy. · A BMI between 25 and 29.9 is considered overweight. A BMI of 30 or higher is considered obese. If your BMI is in the normal range, it means that you have a lower risk for weight-related health problems. If your BMI is in the overweight or obese range, you may be at increased risk for weight-related health problems, such as high blood pressure, heart disease, stroke, arthritis or joint pain, and diabetes. If your BMI is in the underweight range, you may be at increased risk for health problems such as fatigue, lower protection (immunity) against illness, muscle loss, bone loss, hair loss, and hormone problems. BMI is just one measure of your risk for weight-related health problems. You may be at higher risk for health problems if you are not active, you eat an unhealthy diet, or you drink too much alcohol or use tobacco products. Follow-up care is a key part of your treatment and safety. Be sure to make and go to all appointments, and call your doctor if you are having problems.  It's also a good idea to know your test results and keep a list of the medicines you take.  How can you care for yourself at home? · Practice healthy eating habits. This includes eating plenty of fruits, vegetables, whole grains, lean protein, and low-fat dairy. · If your doctor recommends it, get more exercise. Walking is a good choice. Bit by bit, increase the amount you walk every day. Try for at least 30 minutes on most days of the week. · Do not smoke. Smoking can increase your risk for health problems. If you need help quitting, talk to your doctor about stop-smoking programs and medicines. These can increase your chances of quitting for good. · Limit alcohol to 2 drinks a day for men and 1 drink a day for women. Too much alcohol can cause health problems. If you have a BMI higher than 25  · Your doctor may do other tests to check your risk for weight-related health problems. This may include measuring the distance around your waist. A waist measurement of more than 40 inches in men or 35 inches in women can increase the risk of weight-related health problems. · Talk with your doctor about steps you can take to stay healthy or improve your health. You may need to make lifestyle changes to lose weight and stay healthy, such as changing your diet and getting regular exercise. If you have a BMI lower than 18.5  · Your doctor may do other tests to check your risk for health problems. · Talk with your doctor about steps you can take to stay healthy or improve your health. You may need to make lifestyle changes to gain or maintain weight and stay healthy, such as getting more healthy foods in your diet and doing exercises to build muscle. Where can you learn more? Go to http://aby-laura.info/. Enter S176 in the search box to learn more about \"Body Mass Index: Care Instructions. \"  Current as of: October 13, 2016  Content Version: 11.4  © 5069-3014 [employer].  Care instructions adapted under license by [employer] (which disclaims liability or warranty for this information). If you have questions about a medical condition or this instruction, always ask your healthcare professional. [place] [address] any warranty or liability for your use of this information. Follow-up Disposition:  Return in about 4 weeks (around 7/4/2018). 10 minutes of the 15 minutes face to face time with Presley consisted of counseling & coordinating and/or discussing treatment plans in reference to her The primary encounter diagnosis was Obesity (BMI 30-39.9). Diagnoses of Impaired fasting glucose, Elevated blood pressure reading, and Rapid weight loss were also pertinent to this visit. The patient is to follow up as scheduled and will report to the ED or the office if symptoms change or increase. The patient has voiced understanding and will comply.

## 2018-06-06 NOTE — MR AVS SNAPSHOT
[facility] 
 
 
 [address] [facility] [address] [facility] 
[phone] Patient: Jacky Current MRN: [id number] VSQ:6/59/1987 Visit Information Date & Time Provider Department Dept. Phone Encounter #  
 6/6/2018  9:45 AM Louisa Brooks NP [facility] [id number] [phone] Follow-up Instructions Return in about 4 weeks (around 7/4/2018). Follow-up and Disposition History Your Appointments 7/18/2018 63:01 AM  
METABOLIC PROGRAM 15 with Louisa Brooks NP [facility] ([facility]) Appt Note: metabolic program dario [address] [facility], [facility] [address] [address] [facility]  
  
   
 [address], [address] Upcoming Health Maintenance Date Due DTaP/Tdap/Td series (1 - Tdap) 7/12/2011 PAP AKA CERVICAL CYTOLOGY 7/12/2011 Influenza Age 5 to Adult 8/1/2018 Allergies as of 6/6/2018  Review Complete On: 6/6/2018 By: Louisa Brooks NP No Known Allergies Current Immunizations  Never Reviewed No immunizations on file. Not reviewed this visit You Were Diagnosed With   
  
 Codes Comments Obesity (BMI 30-39.9)    -  Primary ICD-10-CM: V55.2 ICD-9-CM: 278.00 Impaired fasting glucose     ICD-10-CM: R73.01 
ICD-9-CM: 790.21 Elevated blood pressure reading     ICD-10-CM: R03.0 ICD-9-CM: 796.2 Rapid weight loss     ICD-10-CM: R63.4 ICD-9-CM: 783.21 Vitals BP Pulse Temp Resp Height(growth percentile) Weight(growth percentile) 120/78 (BP 1 Location: Right arm, BP Patient Position: Sitting) 77 98.5 °F (36.9 °C) (Oral) 14 5' 4\" (1.626 m) 219 lb 9.6 oz (99.6 kg) LMP SpO2 BMI OB Status Smoking Status 06/01/2018 97% 37.69 kg/m2 Having regular periods Never Smoker Vitals History BMI and BSA Data  Body Mass Index Body Surface Area  
 37.69 kg/m 2 2.12 m 2  
  
  
 Your Updated Medication List  
  
   
This list is accurate as of 6/6/18 10:32 AM.  Always use your most recent med list.  
  
  
  
  
 LOW-OGESTREL (28) 0.3-30 mg-mcg Tab Generic drug:  norgestrel-ethinyl estradiol TK 1 T PO ONCE D  
  
 valACYclovir 500 mg tablet Commonly known as:  VALTREX TK 1 T PO  ONCE DAILY We Performed the Following AMB POC EKG ROUTINE W/ 12 LEADS, INTER & REP [86074 CPT(R)] Follow-up Instructions Return in about 4 weeks (around 7/4/2018). To-Do List   
 06/06/2018 Lab:  METABOLIC PANEL, COMPREHENSIVE   
  
 06/06/2018 Lab:  URIC ACID Patient Instructions Health Maintenance Due Topic Date Due  
 DTaP/Tdap/Td series (1 - Tdap) 07/12/2011  PAP AKA CERVICAL CYTOLOGY  07/12/2011 Monthly goal: 
 
8-12 lbs weight loss, you can shoot for 15! Walking 3-4x/week Body Mass Index: Care Instructions Your Care Instructions Body mass index (BMI) can help you see if your weight is raising your risk for health problems. It uses a formula to compare how much you weigh with how tall you are. · A BMI lower than 18.5 is considered underweight. · A BMI between 18.5 and 24.9 is considered healthy. · A BMI between 25 and 29.9 is considered overweight. A BMI of 30 or higher is considered obese. If your BMI is in the normal range, it means that you have a lower risk for weight-related health problems. If your BMI is in the overweight or obese range, you may be at increased risk for weight-related health problems, such as high blood pressure, heart disease, stroke, arthritis or joint pain, and diabetes. If your BMI is in the underweight range, you may be at increased risk for health problems such as fatigue, lower protection (immunity) against illness, muscle loss, bone loss, hair loss, and hormone problems. BMI is just one measure of your risk for weight-related health problems. You may be at higher risk for health problems if you are not active, you eat an unhealthy diet, or you drink too much alcohol or use tobacco products. Follow-up care is a key part of your treatment and safety. Be sure to make and go to all appointments, and call your doctor if you are having problems. It's also a good idea to know your test results and keep a list of the medicines you take. How can you care for yourself at home? · Practice healthy eating habits. This includes eating plenty of fruits, vegetables, whole grains, lean protein, and low-fat dairy. · If your doctor recommends it, get more exercise. Walking is a good choice. Bit by bit, increase the amount you walk every day. Try for at least 30 minutes on most days of the week. · Do not smoke. Smoking can increase your risk for health problems. If you need help quitting, talk to your doctor about stop-smoking programs and medicines. These can increase your chances of quitting for good. · Limit alcohol to 2 drinks a day for men and 1 drink a day for women. Too much alcohol can cause health problems. If you have a BMI higher than 25 · Your doctor may do other tests to check your risk for weight-related health problems. This may include measuring the distance around your waist. A waist measurement of more than 40 inches in men or 35 inches in women can increase the risk of weight-related health problems. · Talk with your doctor about steps you can take to stay healthy or improve your health. You may need to make lifestyle changes to lose weight and stay healthy, such as changing your diet and getting regular exercise. If you have a BMI lower than 18.5 · Your doctor may do other tests to check your risk for health problems. · Talk with your doctor about steps you can take to stay healthy or improve your health.  You may need to make lifestyle changes to gain or maintain weight and stay healthy, such as getting more healthy foods in your diet and doing exercises to build muscle. Where can you learn more? Go to http://aby-laura.info/. Enter S176 in the search box to learn more about \"Body Mass Index: Care Instructions. \" Current as of: October 13, 2016 Content Version: 11.4 © 7727-3520 [employer]. Care instructions adapted under license by [employer] (which disclaims liability or warranty for this information). If you have questions about a medical condition or this instruction, always ask your healthcare professional. [address] any warranty or liability for your use of this information. Patient Instructions History Introducing [facility]! [place] introduces [employer] patient portal. Now you can access parts of your medical record, email your doctor's office, and request medication refills online. 1. In your internet browser, go to [email or web address] 2. Click on the First Time User? Click Here link in the Sign In box. You will see the New Member Sign Up page. 3. Enter your [employer] Access Code exactly as it appears below. You will not need to use this code after youve completed the sign-up process. If you do not sign up before the expiration date, you must request a new code. · [employer] Access Code: [email or web address] Expires: 8/1/2018  8:18 AM 
 
4. Enter the last four digits of your Social Security Number (xxxx) and Date of Birth (mm/dd/yyyy) as indicated and click Submit. You will be taken to the next sign-up page. 5. Create a [employer] ID. This will be your [employer] login ID and cannot be changed, so think of one that is secure and easy to remember. 6. Create a [employer] password. You can change your password at any time. 7. Enter your Password Reset Question and Answer. This can be used at a later time if you forget your password. 8. Enter your e-mail address.  You will receive e-mail notification when new information is available in [employer]. 9. Click Sign Up. You can now view and download portions of your medical record. 10. Click the Download Summary menu link to download a portable copy of your medical information. If you have questions, please visit the Frequently Asked Questions section of the [employer] website. Remember, [employer] is NOT to be used for urgent needs. For medical emergencies, dial 911. Now available from your iPhone and Android! Please provide this summary of care documentation to your next provider. Your primary care clinician is listed as VERN RAMIREZ. If you have any questions after today's visit, please call [phone].

## 2018-06-06 NOTE — PROGRESS NOTES
Chief Complaint   Patient presents with    Weight Management     1. Have you been to the ER, urgent care clinic since your last visit? Hospitalized since your last visit? No    2. Have you seen or consulted any other health care providers outside of the [address] [place] [address] since your last visit? Include any pap smears or colon screening.  No

## 2018-06-13 ENCOUNTER — CLINICAL SUPPORT (OUTPATIENT)
Dept: FAMILY MEDICINE CLINIC | Age: 28
End: 2018-06-13

## 2018-06-13 DIAGNOSIS — E66.9 OBESITY (BMI 30-39.9): Primary | ICD-10-CM

## 2018-06-14 VITALS
DIASTOLIC BLOOD PRESSURE: 89 MMHG | HEART RATE: 112 BPM | HEIGHT: 64 IN | SYSTOLIC BLOOD PRESSURE: 125 MMHG | BODY MASS INDEX: 38.17 KG/M2 | WEIGHT: 223.6 LBS

## 2018-06-14 NOTE — PROGRESS NOTES
Progress Note: Weekly Education Class in the [facility] Weight Loss Program   Is there anything that you or the patient needs to let the [facility] Physician know about? no  Over the past week, have you experienced any side-effects? no    Venessa Llamas is a 32 y.o. female who is enrolled in [facility]    Visit Vitals    /89 (BP 1 Location: Right arm, BP Patient Position: Sitting)    Pulse (!) 112  Comment: feeling anxious/stressed    Ht 5' 4\" (1.626 m)    Wt 223 lb 9.6 oz (101.4 kg)    LMP 06/01/2018    BMI 38.38 kg/m2     Weight Metrics 6/14/2018 6/13/2018 6/6/2018 6/6/2018 5/30/2018 5/23/2018 5/10/2018   Weight - 223 lb 9.6 oz - 219 lb 9.6 oz 226 lb 12.8 oz 228 lb -   Waist Measure Inches 45 - 44 - 45 46 44   Exercise Mins/week - - 60 - - - -   Body Fat % - - 40.6 - - - -   BMI - 38.38 kg/m2 - 37.69 kg/m2 38.93 kg/m2 39.14 kg/m2 -         Have you received any other medical care this week? no  If yes, where and for what? Have you had any change in your medications since your last visit? no  If yes what? Did you have any problems adhering to the program last week? no  If yes, please explain:       Eating Habits Over Last Week:  Did you take in 64 oz of non-caloric fluids? yes     Did you consume your 4 meal replacements each day?  yes       Physical Activity Over the Past Week:    Aerobic exercise: 0 min  Resistance exercise: 0 workouts / week

## 2018-06-20 ENCOUNTER — CLINICAL SUPPORT (OUTPATIENT)
Dept: FAMILY MEDICINE CLINIC | Age: 28
End: 2018-06-20

## 2018-06-20 DIAGNOSIS — E66.9 OBESITY (BMI 30-39.9): Primary | ICD-10-CM

## 2018-06-21 VITALS
DIASTOLIC BLOOD PRESSURE: 85 MMHG | BODY MASS INDEX: 38.68 KG/M2 | HEART RATE: 73 BPM | HEIGHT: 64 IN | SYSTOLIC BLOOD PRESSURE: 132 MMHG | WEIGHT: 226.6 LBS

## 2018-06-21 NOTE — PROGRESS NOTES
Progress Note: Weekly Education Class in the [facility] Loss Program   Is there anything that you or the patient needs to let the [facility] Physician know about? no  Over the past week, have you experienced any side-effects? no    Shantell Rosales is a 32 y.o. female who is enrolled in [facility]    Visit Vitals    /85 (BP 1 Location: Right arm, BP Patient Position: Sitting)    Pulse 73    Ht 5' 4\" (1.626 m)    Wt 226 lb 9.6 oz (102.8 kg)    LMP 06/01/2018    BMI 38.9 kg/m2     Weight Metrics 6/21/2018 6/20/2018 6/14/2018 6/13/2018 6/6/2018 6/6/2018 5/30/2018   Weight - 226 lb 9.6 oz - 223 lb 9.6 oz - 219 lb 9.6 oz 226 lb 12.8 oz   Waist Measure Inches 46 - 45 - 44 - 45   Exercise Mins/week - - - - 60 - -   Body Fat % - - - - 40.6 - -   BMI - 38.9 kg/m2 - 38.38 kg/m2 - 37.69 kg/m2 38.93 kg/m2         Have you received any other medical care this week? no  If yes, where and for what? Have you had any change in your medications since your last visit? no  If yes what? Did you have any problems adhering to the program last week? no  If yes, please explain:       Eating Habits Over Last Week:  Did you take in 64 oz of non-caloric fluids? yes     Did you consume your 4 meal replacements each day?  yes       Physical Activity Over the Past Week:    Aerobic exercise: 0 min  Resistance exercise: 0 workouts / week

## 2018-07-11 ENCOUNTER — CLINICAL SUPPORT (OUTPATIENT)
Dept: FAMILY MEDICINE CLINIC | Age: 28
End: 2018-07-11

## 2018-07-11 DIAGNOSIS — E66.9 OBESITY (BMI 30-39.9): Primary | ICD-10-CM

## 2018-07-13 VITALS
SYSTOLIC BLOOD PRESSURE: 130 MMHG | HEART RATE: 82 BPM | DIASTOLIC BLOOD PRESSURE: 87 MMHG | BODY MASS INDEX: 38.96 KG/M2 | HEIGHT: 64 IN | WEIGHT: 228.2 LBS

## 2018-07-13 NOTE — PROGRESS NOTES
Progress Note: Weekly Education Class in the [facility]   Is there anything that you or the patient needs to let the [facility] Physician know about? no  Over the past week, have you experienced any side-effects? no    Josefina Rowell is a 29 y.o. female who is enrolled in [facility]    Visit Vitals    /87 (BP 1 Location: Right arm, BP Patient Position: Sitting)    Pulse 82    Ht 5' 4\" (1.626 m)    Wt 228 lb 3.2 oz (103.5 kg)    BMI 39.17 kg/m2     Weight Metrics 7/13/2018 7/11/2018 6/21/2018 6/20/2018 6/14/2018 6/13/2018 6/6/2018   Weight - 228 lb 3.2 oz - 226 lb 9.6 oz - 223 lb 9.6 oz -   Waist Measure Inches 46 - 46 - 45 - 44   Exercise Mins/week - - - - - - 60   Body Fat % - - - - - - 40.6   BMI - 39.17 kg/m2 - 38.9 kg/m2 - 38.38 kg/m2 -         Have you received any other medical care this week? no  If yes, where and for what? Have you had any change in your medications since your last visit? no  If yes what? Did you have any problems adhering to the program last week? no  If yes, please explain:       Eating Habits Over Last Week:  Did you take in 64 oz of non-caloric fluids? yes     Did you consume your 4 meal replacements each day?  yes       Physical Activity Over the Past Week:    Aerobic exercise: 0 min  Resistance exercise: 0 workouts / week

## 2018-07-14 ENCOUNTER — HOSPITAL ENCOUNTER (OUTPATIENT)
Dept: LAB | Age: 28
Discharge: HOME | End: 2018-07-14
Payer: COMMERCIAL

## 2018-07-14 DIAGNOSIS — E66.9 OBESITY (BMI 30-39.9): ICD-10-CM

## 2018-07-14 LAB
ALBUMIN SERPL-MCNC: 4.1 G/DL (ref 3.4–5)
ALBUMIN/GLOB SERPL: 1.1 {RATIO} (ref 0.8–1.7)
ALP SERPL-CCNC: 73 U/L (ref 45–117)
ALT SERPL-CCNC: 51 U/L (ref 13–56)
ANION GAP SERPL CALC-SCNC: 10 MMOL/L (ref 3–18)
AST SERPL-CCNC: 37 U/L (ref 15–37)
BILIRUB SERPL-MCNC: 0.5 MG/DL (ref 0.2–1)
BUN SERPL-MCNC: 10 MG/DL (ref 7–18)
BUN/CREAT SERPL: 14 (ref 12–20)
CALCIUM SERPL-MCNC: 9.8 MG/DL (ref 8.5–10.1)
CHLORIDE SERPL-SCNC: 104 MMOL/L (ref 100–108)
CO2 SERPL-SCNC: 27 MMOL/L (ref 21–32)
CREAT SERPL-MCNC: 0.74 MG/DL (ref 0.6–1.3)
GLOBULIN SER CALC-MCNC: 3.8 G/DL (ref 2–4)
GLUCOSE SERPL-MCNC: 84 MG/DL (ref 74–99)
POTASSIUM SERPL-SCNC: 4.2 MMOL/L (ref 3.5–5.5)
PROT SERPL-MCNC: 7.9 G/DL (ref 6.4–8.2)
SODIUM SERPL-SCNC: 141 MMOL/L (ref 136–145)
URATE SERPL-MCNC: 4.9 MG/DL (ref 2.6–7.2)

## 2018-07-14 PROCEDURE — 36415 COLL VENOUS BLD VENIPUNCTURE: CPT | Performed by: NURSE PRACTITIONER

## 2018-07-14 PROCEDURE — 80053 COMPREHEN METABOLIC PANEL: CPT | Performed by: NURSE PRACTITIONER

## 2018-07-14 PROCEDURE — 84550 ASSAY OF BLOOD/URIC ACID: CPT | Performed by: NURSE PRACTITIONER

## 2018-07-18 ENCOUNTER — CLINICAL SUPPORT (OUTPATIENT)
Dept: FAMILY MEDICINE CLINIC | Age: 28
End: 2018-07-18

## 2018-07-18 VITALS
RESPIRATION RATE: 20 BRPM | BODY MASS INDEX: 39.27 KG/M2 | DIASTOLIC BLOOD PRESSURE: 99 MMHG | SYSTOLIC BLOOD PRESSURE: 153 MMHG | HEART RATE: 94 BPM | WEIGHT: 230 LBS | HEIGHT: 64 IN

## 2018-07-18 DIAGNOSIS — E66.9 OBESITY (BMI 30-39.9): Primary | ICD-10-CM

## 2018-07-18 NOTE — PROGRESS NOTES
Progress Note: Weekly Education Class in the [facility]   Is there anything that you or the patient needs to let the [facility] Physician know about? Yes patient blood pressure is elevated 153/99 Pulse 94, this blood pressure is very similar to the one taken back on 6/6/18 when it was 152/100, Pulse 83, it was taken twice that visit in the office after resting a good while /78, Pulse 77. The patient was rude during triage tonight, and did not want me to inform her that her weight had gone up a couple of pounds, when asked if she was eating any outside foods she stated Yes, and that the physician knew she was doing this. The patient has been in the program 24 weeks to date, and has never listed any outside foods on her Home Work Sheet (Note: I have never asked her if she had been consuming any outside foods until tonight). When I reviewed her Homework Sheet with her she reported she was still eating her 4 Meal Replacements each day, and her outside foods daily. I asked why she did not write down what outside foods she was consuming, and she said, she did not have to, and that the physician knows she is doing both the 4 Meal Replacements per day, and Outside foods . The patient has never been rude before, and not sure why she was that way today. The Nutritionist heard some of the conversation, and the rude tone the patient was having with me, and asked me if everything was ok? The Nutritionist tonight will try to explain to the group about not consuming our Meal Replacements the same time they are having outside foods, and the implication this may have on the patient if this is done, when she gives the class tonight.     Over the past week, have you experienced any side-effects? no    Shelia Stein is a 29 y.o. female who is enrolled in [facility]    Visit Vitals    BP (!) 153/99 (BP 1 Location: Right arm, BP Patient Position: Sitting)    Pulse 94    Resp 20    Ht 5' 4\" (1.626 m)    Wt 230 lb (104.3 kg)    BMI 39.48 kg/m2     Weight Metrics 7/18/2018 7/13/2018 7/11/2018 6/21/2018 6/20/2018 6/14/2018 6/13/2018   Weight 230 lb - 228 lb 3.2 oz - 226 lb 9.6 oz - 223 lb 9.6 oz   Waist Measure Inches 46 46 - 46 - 45 -   Exercise Mins/week - - - - - - -   Body Fat % - - - - - - -   BMI 39.48 kg/m2 - 39.17 kg/m2 - 38.9 kg/m2 - 38.38 kg/m2         Have you received any other medical care this week? no  If yes, where and for what? Have you had any change in your medications since your last visit? no  If yes what? Did you have any problems adhering to the program last week? no  If yes, please explain:       Eating Habits Over Last Week:  Did you take in 64 oz of non-caloric fluids?  yes     Did you consume your 4 meal replacements each day? yes and outside foods, the patient did not put down what outside foods, and states the physician is aware of what she eats off the program.      Physical Activity Over the Past Week:    Aerobic exercise: 0 min  Resistance exercise: 0 workouts / week

## 2018-07-23 NOTE — PROGRESS NOTES
Nurse note from patient's weekly VLCD / LCD / Maintenance class was reviewed.   Pertinent medical concerns were:  Below noted    Continue current regimen

## 2018-08-01 ENCOUNTER — CLINICAL SUPPORT (OUTPATIENT)
Dept: FAMILY MEDICINE CLINIC | Age: 28
End: 2018-08-01

## 2018-08-01 DIAGNOSIS — E66.9 OBESITY (BMI 30-39.9): Primary | ICD-10-CM

## 2018-08-02 VITALS
DIASTOLIC BLOOD PRESSURE: 88 MMHG | HEART RATE: 87 BPM | HEIGHT: 64 IN | SYSTOLIC BLOOD PRESSURE: 137 MMHG | BODY MASS INDEX: 39.2 KG/M2 | WEIGHT: 229.6 LBS

## 2018-08-02 NOTE — PROGRESS NOTES
Progress Note: Weekly Education Class in the [facility]   Is there anything that you or the patient needs to let the [facility] Physician know about? no  Over the past week, have you experienced any side-effects? no    Asuncion Castleman is a 29 y.o. female who is enrolled in [facility]    Visit Vitals    /88 (BP 1 Location: Right arm, BP Patient Position: Sitting)    Pulse 87    Ht 5' 4\" (1.626 m)    Wt 229 lb 9.6 oz (104.1 kg)    BMI 39.41 kg/m2     Weight Metrics 8/2/2018 8/1/2018 7/18/2018 7/13/2018 7/11/2018 6/21/2018 6/20/2018   Weight - 229 lb 9.6 oz 230 lb - 228 lb 3.2 oz - 226 lb 9.6 oz   Waist Measure Inches 47 - 46 46 - 46 -   Exercise Mins/week - - - - - - -   Body Fat % - - - - - - -   BMI - 39.41 kg/m2 39.48 kg/m2 - 39.17 kg/m2 - 38.9 kg/m2         Have you received any other medical care this week? no  If yes, where and for what? Have you had any change in your medications since your last visit? no  If yes what? Did you have any problems adhering to the program last week? no  If yes, please explain:       Eating Habits Over Last Week:  Did you take in 64 oz of non-caloric fluids? yes     Did you consume your 4 meal replacements each day? no eating at times steak, chicken on the days this meat was eaten the patient only had 3 meal replacements, all other days the 4 meal replacements were consumed.       Physical Activity Over the Past Week:    Aerobic exercise: 0 min  Resistance exercise: 0 workouts / week

## 2018-08-08 ENCOUNTER — OFFICE VISIT (OUTPATIENT)
Dept: INTERNAL MEDICINE CLINIC | Age: 28
End: 2018-08-08

## 2018-08-08 VITALS
DIASTOLIC BLOOD PRESSURE: 81 MMHG | OXYGEN SATURATION: 97 % | SYSTOLIC BLOOD PRESSURE: 119 MMHG | BODY MASS INDEX: 39.49 KG/M2 | HEART RATE: 95 BPM | HEIGHT: 64 IN | RESPIRATION RATE: 16 BRPM | TEMPERATURE: 97.8 F | WEIGHT: 231.3 LBS

## 2018-08-08 DIAGNOSIS — R73.01 IMPAIRED FASTING GLUCOSE: ICD-10-CM

## 2018-08-08 DIAGNOSIS — E66.9 OBESITY (BMI 30-39.9): Primary | ICD-10-CM

## 2018-08-08 RX ORDER — PHENTERMINE HYDROCHLORIDE 37.5 MG/1
37.5 TABLET ORAL
Qty: 30 TAB | Refills: 0 | Status: SHIPPED | OUTPATIENT
Start: 2018-08-08

## 2018-08-08 NOTE — MR AVS SNAPSHOT
[address] 
[phone] Patient: April Salazar MRN: [id number] Visit Information Date & Time Provider Department Dept. Phone Encounter #  
 8/8/2018  8:15 AM Karan Hedrick NP [facility] [id number] Upcoming Health Maintenance Date Due DTaP/Tdap/Td series (1 - Tdap) 7/12/2011 PAP AKA CERVICAL CYTOLOGY 7/12/2011 Influenza Age 5 to Adult 8/1/2018 Allergies as of 8/8/2018  Review Complete On: 8/8/2018 By: Karan Hedrick NP No Known Allergies Current Immunizations  Never Reviewed No immunizations on file. Not reviewed this visit You Were Diagnosed With   
  
 Codes Comments Obesity (BMI 30-39.9)    -  Primary ICD-10-CM: U98.0 ICD-9-CM: 278.00 Impaired fasting glucose     ICD-10-CM: R73.01 
ICD-9-CM: 790.21 Vitals BP Pulse Temp Resp Height(growth percentile) Weight(growth percentile) 119/81 (BP 1 Location: Right arm, BP Patient Position: Sitting) 95 97.8 °F (36.6 °C) (Oral) 16 5' 4\" (1.626 m) 231 lb 4.8 oz (104.9 kg) LMP SpO2 BMI OB Status Smoking Status 07/12/2018 97% 39.7 kg/m2 Having regular periods Never Smoker BMI and BSA Data Body Mass Index Body Surface Area 39.7 kg/m 2 2.18 m 2 Your Updated Medication List  
  
   
This list is accurate as of 8/8/18  8:49 AM.  Always use your most recent med list.  
  
  
  
  
 LOW-OGESTREL (28) 0.3-30 mg-mcg Tab Generic drug:  norgestrel-ethinyl estradiol TK 1 T PO ONCE D  
  
 phentermine 37.5 mg tablet Commonly known as:  ADIPEX-P Take 1 Tab by mouth every morning. Max Daily Amount: 37.5 mg.  
  
 valACYclovir 500 mg tablet Commonly known as:  VALTREX TK 1 T PO  ONCE DAILY Prescriptions Printed  Refills  
 phentermine (ADIPEX-P) 37.5 mg tablet 0  
 Sig: Take 1 Tab by mouth every morning. Max Daily Amount: 37.5 mg.  
 Class: Print Route: Oral  
  
Patient Instructions Health Maintenance Due Topic Date Due  
 DTaP/Tdap/Td series (1 - Tdap) 07/12/2011  PAP AKA CERVICAL CYTOLOGY  07/12/2011  Influenza Age 5 to Adult  08/01/2018 Monthly goal: 
 
Get back on track, you should at least be maintaining weight, even with eating off program. 
 
Make good choices and after vacation take the time to get back into ketosis. Body Mass Index: Care Instructions Your Care Instructions Body mass index (BMI) can help you see if your weight is raising your risk for health problems. It uses a formula to compare how much you weigh with how tall you are. · A BMI lower than 18.5 is considered underweight. · A BMI between 18.5 and 24.9 is considered healthy. · A BMI between 25 and 29.9 is considered overweight. A BMI of 30 or higher is considered obese. If your BMI is in the normal range, it means that you have a lower risk for weight-related health problems. If your BMI is in the overweight or obese range, you may be at increased risk for weight-related health problems, such as high blood pressure, heart disease, stroke, arthritis or joint pain, and diabetes. If your BMI is in the underweight range, you may be at increased risk for health problems such as fatigue, lower protection (immunity) against illness, muscle loss, bone loss, hair loss, and hormone problems. BMI is just one measure of your risk for weight-related health problems. You may be at higher risk for health problems if you are not active, you eat an unhealthy diet, or you drink too much alcohol or use tobacco products. Follow-up care is a key part of your treatment and safety. Be sure to make and go to all appointments, and call your doctor if you are having problems. It's also a good idea to know your test results and keep a list of the medicines you take. How can you care for yourself at home? · Practice healthy eating habits. This includes eating plenty of fruits, vegetables, whole grains, lean protein, and low-fat dairy. · If your doctor recommends it, get more exercise. Walking is a good choice. Bit by bit, increase the amount you walk every day. Try for at least 30 minutes on most days of the week. · Do not smoke. Smoking can increase your risk for health problems. If you need help quitting, talk to your doctor about stop-smoking programs and medicines. These can increase your chances of quitting for good. · Limit alcohol to 2 drinks a day for men and 1 drink a day for women. Too much alcohol can cause health problems. If you have a BMI higher than 25 · Your doctor may do other tests to check your risk for weight-related health problems. This may include measuring the distance around your waist. A waist measurement of more than 40 inches in men or 35 inches in women can increase the risk of weight-related health problems. · Talk with your doctor about steps you can take to stay healthy or improve your health. You may need to make lifestyle changes to lose weight and stay healthy, such as changing your diet and getting regular exercise. If you have a BMI lower than 18.5 · Your doctor may do other tests to check your risk for health problems. · Talk with your doctor about steps you can take to stay healthy or improve your health. You may need to make lifestyle changes to gain or maintain weight and stay healthy, such as getting more healthy foods in your diet and doing exercises to build muscle. Where can you learn more? Go to http://aby-laura.info/. Enter S176 in the search box to learn more about \"Body Mass Index: Care Instructions. \" Current as of: October 13, 2016 Content Version: 11.4 © 9212-1512 Healthwise, Incorporated.  Care instructions adapted under license by [address] (which disclaims liability or warranty for this information). If you have questions about a medical condition or this instruction, always ask your healthcare professional. [address] any warranty or liability for your use of this information. Introducing [facility]! Dear Geraldine Dutton: Thank you for requesting a Rhapso account. Our records indicate that you already have an active Rhapso account. You can access your account anytime at [email or web address] Did you know that you can access your hospital and ER discharge instructions at any time in Rhapso? You can also review all of your test results from your hospital stay or ER visit. Additional Information If you have questions, please visit the Frequently Asked Questions section of the Rhapso website at [email or web address] Remember, Rhapso is NOT to be used for urgent needs. For medical emergencies, dial 911. Now available from your iPhone and Android! Please provide this summary of care documentation to your next provider. Your primary care clinician is listed as VERN RAMIREZ. If you have any questions after today's visit, please call [phone].

## 2018-08-08 NOTE — PROGRESS NOTES
Chief Complaint   Patient presents with    Weight Management     1. Have you been to the ER, urgent care clinic since your last visit? Hospitalized since your last visit? No    2. Have you seen or consulted any other health care providers outside of the [address] [place] [address] since your last visit? Include any pap smears or colon screening.  No

## 2018-08-08 NOTE — PROGRESS NOTES
[facility] Progress Note:   F/up Physician Visit    CC: Obesity      Guido Mandel is a 29 y.o. female who is here for her  f/up physician visit for the VLCD Program. Dmitry Sky has completed 27 weeks of the program to date. 12 lbs weight gain over past 2 months. Has vacation next week, will try LCD and then get back to [facility] after vacation. Discussed several options for rx help with weight loss. Feels like she has increased appetite and obsessive thoughts about food. Discussed wellbutrin v phentermine or combo medications. Will start on phentermine 37.5 mg, 1/2 tab daily with uptitration if needed and no side effects. If not helpful will discuss other options next month.     Weight History  starting weight 274.8 lb Body mass index is 43.04 kg/(m^2). Current weight 231 lbs  Goal weight 150 lb  Highest weight 274.8lb, at 32years old      EKG due 169 lbs    Weight Metrics 8/8/2018 8/8/2018 8/2/2018 8/1/2018 7/18/2018 7/13/2018 7/11/2018   Weight - 231 lb 4.8 oz - 229 lb 9.6 oz 230 lb - 228 lb 3.2 oz   Waist Measure Inches 47 - 47 - 46 46 -   Exercise Mins/week 0 - - - - - -   Body Fat % 40 - - - - - -   BMI - 39.7 kg/m2 - 39.41 kg/m2 39.48 kg/m2 - 39.17 kg/m2         Current Outpatient Prescriptions   Medication Sig Dispense Refill    LOW-OGESTREL, 28, 0.3-30 mg-mcg tab TK 1 T PO ONCE D  11    valACYclovir (VALTREX) 500 mg tablet TK 1 T PO  ONCE DAILY  10         Participation   Did you attend clinic and class last week? yes    Review of Systems  Since your last visit, have you experienced any complications? no  If yes, please list:     No CP, SOB, palpitations, lightheadedness, dizziness, constipation. Positives highlight in BOLD. Are you taking an appetite suppressant? no  If so, is there any Chest Pain, Palpitations or Dizziness?   BP Readings from Last 10 Encounters:   08/08/18 119/81   08/02/18 137/88   07/18/18 (!) 153/99   07/13/18 130/87   06/21/18 132/85   06/14/18 125/89 06/06/18 120/78   05/30/18 118/84   05/23/18 137/82   05/10/18 (!) 131/92         Have you received any other medical care this week? no  If yes, where and for what? Have you discontinued or changed any medicine or dose of your medicine since your last visit? no  If yes, where and for what? Diet  How many ounces of calorie-free liquids did you consume each day?  100 oz    How many meal replacements did you take each day? 0-4    Did you have any problems adhering to the program?  yes If yes, please explain: eating outside foods      Exercise  Aerobic exercise: 0 min  Resistance exercise: 0 workouts / week  Any discomfort?  no     If yes, where? Review of Systems  Complete ROS negative except where noted above    Objective  Visit Vitals    /81 (BP 1 Location: Right arm, BP Patient Position: Sitting)    Pulse 95    Temp 97.8 °F (36.6 °C) (Oral)    Resp 16    Ht 5' 4\" (1.626 m)    Wt 231 lb 4.8 oz (104.9 kg)    LMP 07/12/2018    SpO2 97%    BMI 39.7 kg/m2     Patient's last menstrual period was 07/12/2018. PHQ over the last two weeks 2/1/2018   Little interest or pleasure in doing things Not at all   Feeling down, depressed, irritable, or hopeless Several days   Total Score PHQ 2 1         Waist Circumference: I personally reviewed patient's Weight Management Doc Flowsheet  Neck Circumference: I personally reviewed patient's Weight Management Doc Flowsheet  Percent Body Fat: I personally reviewed patient's Weight Management Doc Flowsheet    Physical Exam  Appearance: well appearing, obese, A&O, NAD  HEENT:  NC/AT, PERRL, No scleral icterus  Heart:  RRR without M/R/G  Lungs:  CTAB, no rhonchi, rales, or wheezes with good air exchange   Ext:  No LE Edema    Assessment / Plan    Encounter Diagnoses   Name Primary?  Obesity (BMI 30-39. 9) Yes    Impaired fasting glucose        1. Weight management needs improvement   Progress was reviewed with patient    2.   Labs    Latest results reviewed with patient   Lab slip given to pt for f/up HDL labs    3. Diet regimen   # of meal replacements prescribed: 4   If modified LCD-nutritional guidelines:    Monthly Goal   As below    Medical monitoring schedule:   Weekly BP/Weight checks   Monthly provider appointments  I have reviewed/discussed the above normal BMI with the patient. I have recommended the following interventions: dietary management education, guidance, and counseling, monitor weight and prescribed dietary intake . .      Ms. Eletha Duane has a reminder for a \"due or due soon\" health maintenance. I have asked that she contact her primary care provider for follow-up on this health maintenance. Patient Instructions     Health Maintenance Due   Topic Date Due    DTaP/Tdap/Td series (1 - Tdap) 07/12/2011    PAP AKA CERVICAL CYTOLOGY  07/12/2011    Influenza Age 5 to Adult  08/01/2018     Monthly goal:    Get back on track, you should at least be maintaining weight, even with eating off program.    Make good choices and after vacation take the time to get back into ketosis. Body Mass Index: Care Instructions  Your Care Instructions    Body mass index (BMI) can help you see if your weight is raising your risk for health problems. It uses a formula to compare how much you weigh with how tall you are. · A BMI lower than 18.5 is considered underweight. · A BMI between 18.5 and 24.9 is considered healthy. · A BMI between 25 and 29.9 is considered overweight. A BMI of 30 or higher is considered obese. If your BMI is in the normal range, it means that you have a lower risk for weight-related health problems. If your BMI is in the overweight or obese range, you may be at increased risk for weight-related health problems, such as high blood pressure, heart disease, stroke, arthritis or joint pain, and diabetes.  If your BMI is in the underweight range, you may be at increased risk for health problems such as fatigue, lower protection (immunity) against illness, muscle loss, bone loss, hair loss, and hormone problems. BMI is just one measure of your risk for weight-related health problems. You may be at higher risk for health problems if you are not active, you eat an unhealthy diet, or you drink too much alcohol or use tobacco products. Follow-up care is a key part of your treatment and safety. Be sure to make and go to all appointments, and call your doctor if you are having problems. It's also a good idea to know your test results and keep a list of the medicines you take. How can you care for yourself at home? · Practice healthy eating habits. This includes eating plenty of fruits, vegetables, whole grains, lean protein, and low-fat dairy. · If your doctor recommends it, get more exercise. Walking is a good choice. Bit by bit, increase the amount you walk every day. Try for at least 30 minutes on most days of the week. · Do not smoke. Smoking can increase your risk for health problems. If you need help quitting, talk to your doctor about stop-smoking programs and medicines. These can increase your chances of quitting for good. · Limit alcohol to 2 drinks a day for men and 1 drink a day for women. Too much alcohol can cause health problems. If you have a BMI higher than 25  · Your doctor may do other tests to check your risk for weight-related health problems. This may include measuring the distance around your waist. A waist measurement of more than 40 inches in men or 35 inches in women can increase the risk of weight-related health problems. · Talk with your doctor about steps you can take to stay healthy or improve your health. You may need to make lifestyle changes to lose weight and stay healthy, such as changing your diet and getting regular exercise. If you have a BMI lower than 18.5  · Your doctor may do other tests to check your risk for health problems.   · Talk with your doctor about steps you can take to stay healthy or improve your health. You may need to make lifestyle changes to gain or maintain weight and stay healthy, such as getting more healthy foods in your diet and doing exercises to build muscle. Where can you learn more? Go to http://aby-laura.info/. Enter S176 in the search box to learn more about \"Body Mass Index: Care Instructions. \"  Current as of: October 13, 2016  Content Version: 11.4  © 3147-5075 [employer]. Care instructions adapted under license by [employer] (which disclaims liability or warranty for this information). If you have questions about a medical condition or this instruction, always ask your healthcare professional. [place] [address] any warranty or liability for your use of this information. Follow-up Disposition: Not on File      10 minutes of the 15 minutes face to face time with Shanice Torres consisted of counseling & coordinating and/or discussing treatment plans in reference to her The primary encounter diagnosis was Obesity (BMI 30-39.9). A diagnosis of Impaired fasting glucose was also pertinent to this visit. The patient is to follow up as scheduled and will report to the ED or the office if symptoms change or increase. The patient has voiced understanding and will comply.

## 2018-08-08 NOTE — PATIENT INSTRUCTIONS
Health Maintenance Due   Topic Date Due    DTaP/Tdap/Td series (1 - Tdap) 07/12/2011    PAP AKA CERVICAL CYTOLOGY  07/12/2011    Influenza Age 5 to Adult  08/01/2018     Monthly goal:    Get back on track, you should at least be maintaining weight, even with eating off program.    Make good choices and after vacation take the time to get back into ketosis. Body Mass Index: Care Instructions  Your Care Instructions    Body mass index (BMI) can help you see if your weight is raising your risk for health problems. It uses a formula to compare how much you weigh with how tall you are. · A BMI lower than 18.5 is considered underweight. · A BMI between 18.5 and 24.9 is considered healthy. · A BMI between 25 and 29.9 is considered overweight. A BMI of 30 or higher is considered obese. If your BMI is in the normal range, it means that you have a lower risk for weight-related health problems. If your BMI is in the overweight or obese range, you may be at increased risk for weight-related health problems, such as high blood pressure, heart disease, stroke, arthritis or joint pain, and diabetes. If your BMI is in the underweight range, you may be at increased risk for health problems such as fatigue, lower protection (immunity) against illness, muscle loss, bone loss, hair loss, and hormone problems. BMI is just one measure of your risk for weight-related health problems. You may be at higher risk for health problems if you are not active, you eat an unhealthy diet, or you drink too much alcohol or use tobacco products. Follow-up care is a key part of your treatment and safety. Be sure to make and go to all appointments, and call your doctor if you are having problems. It's also a good idea to know your test results and keep a list of the medicines you take. How can you care for yourself at home? · Practice healthy eating habits.  This includes eating plenty of fruits, vegetables, whole grains, lean protein, and low-fat dairy. · If your doctor recommends it, get more exercise. Walking is a good choice. Bit by bit, increase the amount you walk every day. Try for at least 30 minutes on most days of the week. · Do not smoke. Smoking can increase your risk for health problems. If you need help quitting, talk to your doctor about stop-smoking programs and medicines. These can increase your chances of quitting for good. · Limit alcohol to 2 drinks a day for men and 1 drink a day for women. Too much alcohol can cause health problems. If you have a BMI higher than 25  · Your doctor may do other tests to check your risk for weight-related health problems. This may include measuring the distance around your waist. A waist measurement of more than 40 inches in men or 35 inches in women can increase the risk of weight-related health problems. · Talk with your doctor about steps you can take to stay healthy or improve your health. You may need to make lifestyle changes to lose weight and stay healthy, such as changing your diet and getting regular exercise. If you have a BMI lower than 18.5  · Your doctor may do other tests to check your risk for health problems. · Talk with your doctor about steps you can take to stay healthy or improve your health. You may need to make lifestyle changes to gain or maintain weight and stay healthy, such as getting more healthy foods in your diet and doing exercises to build muscle. Where can you learn more? Go to http://aby-laura.info/. Enter S176 in the search box to learn more about \"Body Mass Index: Care Instructions. \"  Current as of: October 13, 2016  Content Version: 11.4  © 3158-9423 [employer]. Care instructions adapted under license by [employer] (which disclaims liability or warranty for this information).  If you have questions about a medical condition or this instruction, always ask your healthcare professional. [employer] disclaims any warranty or liability for your use of this information.

## 2018-08-22 ENCOUNTER — CLINICAL SUPPORT (OUTPATIENT)
Dept: FAMILY MEDICINE CLINIC | Age: 28
End: 2018-08-22

## 2018-08-22 DIAGNOSIS — E66.9 OBESITY (BMI 30-39.9): Primary | ICD-10-CM

## 2018-08-24 VITALS
WEIGHT: 236.2 LBS | DIASTOLIC BLOOD PRESSURE: 76 MMHG | BODY MASS INDEX: 40.33 KG/M2 | HEIGHT: 64 IN | HEART RATE: 92 BPM | SYSTOLIC BLOOD PRESSURE: 110 MMHG

## 2018-08-24 NOTE — PROGRESS NOTES
Progress Note: Weekly Education Class in the [facility] Loss Program   Is there anything that you or the patient needs to let the [facility] Physician know about? no  Over the past week, have you experienced any side-effects? no    Fallon Vega is a 29 y.o. female who is enrolled in [facility]    Visit Vitals    /76 (BP 1 Location: Right arm, BP Patient Position: Sitting)    Pulse 92    Ht 5' 4\" (1.626 m)    Wt 236 lb 3.2 oz (107.1 kg)    BMI 40.54 kg/m2     Weight Metrics 8/24/2018 8/22/2018 8/8/2018 8/8/2018 8/2/2018 8/1/2018 7/18/2018   Weight - 236 lb 3.2 oz - 231 lb 4.8 oz - 229 lb 9.6 oz 230 lb   Waist Measure Inches 47 - 47 - 47 - 46   Exercise Mins/week - - 0 - - - -   Body Fat % - - 40 - - - -   BMI - 40.54 kg/m2 - 39.7 kg/m2 - 39.41 kg/m2 39.48 kg/m2         Have you received any other medical care this week? no  If yes, where and for what? Have you had any change in your medications since your last visit? no  If yes what? Did you have any problems adhering to the program last week? no  If yes, please explain:       Eating Habits Over Last Week:  Did you take in 64 oz of non-caloric fluids? yes     Did you consume your 4 meal replacements each day?  yes       Physical Activity Over the Past Week:    Aerobic exercise: 0 min  Resistance exercise: 0 workouts / week

## 2019-11-08 ENCOUNTER — HOSPITAL ENCOUNTER (OUTPATIENT)
Dept: LAB | Age: 29
Discharge: HOME | End: 2019-11-08
Payer: COMMERCIAL

## 2019-11-08 LAB
25(OH)D3 SERPL-MCNC: 17.5 NG/ML (ref 30–100)
ALBUMIN SERPL-MCNC: 3.8 G/DL (ref 3.4–5)
ALBUMIN/GLOB SERPL: 1.2 {RATIO} (ref 0.8–1.7)
ALP SERPL-CCNC: 57 U/L (ref 45–117)
ALT SERPL-CCNC: 57 U/L (ref 13–56)
ANION GAP SERPL CALC-SCNC: 8 MMOL/L (ref 3–18)
AST SERPL-CCNC: 24 U/L (ref 10–38)
BILIRUB SERPL-MCNC: 0.3 MG/DL (ref 0.2–1)
BUN SERPL-MCNC: 10 MG/DL (ref 7–18)
BUN/CREAT SERPL: 14 (ref 12–20)
CALCIUM SERPL-MCNC: 8.9 MG/DL (ref 8.5–10.1)
CHLORIDE SERPL-SCNC: 106 MMOL/L (ref 100–111)
CO2 SERPL-SCNC: 26 MMOL/L (ref 21–32)
CREAT SERPL-MCNC: 0.7 MG/DL (ref 0.6–1.3)
GGT SERPL-CCNC: 33 U/L (ref 5–55)
GLOBULIN SER CALC-MCNC: 3.1 G/DL (ref 2–4)
GLUCOSE SERPL-MCNC: 91 MG/DL (ref 74–99)
POTASSIUM SERPL-SCNC: 4.4 MMOL/L (ref 3.5–5.5)
PROT SERPL-MCNC: 6.9 G/DL (ref 6.4–8.2)
SODIUM SERPL-SCNC: 140 MMOL/L (ref 136–145)
TSH SERPL DL<=0.05 MIU/L-ACNC: 1.68 UIU/ML (ref 0.36–3.74)

## 2019-11-08 PROCEDURE — 82306 VITAMIN D 25 HYDROXY: CPT

## 2019-11-08 PROCEDURE — 84443 ASSAY THYROID STIM HORMONE: CPT

## 2019-11-08 PROCEDURE — 36415 COLL VENOUS BLD VENIPUNCTURE: CPT

## 2019-11-08 PROCEDURE — 82977 ASSAY OF GGT: CPT

## 2019-11-08 PROCEDURE — 80053 COMPREHEN METABOLIC PANEL: CPT
